# Patient Record
Sex: FEMALE | Race: WHITE | Employment: UNEMPLOYED | URBAN - METROPOLITAN AREA
[De-identification: names, ages, dates, MRNs, and addresses within clinical notes are randomized per-mention and may not be internally consistent; named-entity substitution may affect disease eponyms.]

---

## 2017-03-31 PROBLEM — O03.4 INCOMPLETE ABORTION: Status: ACTIVE | Noted: 2017-03-31

## 2018-11-06 ENCOUNTER — HOSPITAL ENCOUNTER (OUTPATIENT)
Age: 38
Discharge: HOME | End: 2018-11-06
Payer: COMMERCIAL

## 2018-11-06 LAB
GLUCOSE TOLERANCE SCREEN 50G: 147 MG/DL (ref 70–140)
HCT VFR BLD CALC: 34.2 % (ref 34–48)
HEMOGLOBIN: 11.5 G/DL (ref 11.5–15.5)
MCH RBC QN AUTO: 33.3 PG (ref 26–35)
MCHC RBC AUTO-ENTMCNC: 33.6 % (ref 32–34.5)
MCV RBC AUTO: 99.1 FL (ref 80–99.9)
PDW BLD-RTO: 14 FL (ref 11.5–15)
PLATELET # BLD: 180 E9/L (ref 130–450)
PMV BLD AUTO: 8.9 FL (ref 7–12)
RBC # BLD: 3.45 E12/L (ref 3.5–5.5)
WBC # BLD: 7.5 E9/L (ref 4.5–11.5)

## 2018-11-06 PROCEDURE — 82950 GLUCOSE TEST: CPT

## 2018-11-06 PROCEDURE — 36415 COLL VENOUS BLD VENIPUNCTURE: CPT

## 2018-11-06 PROCEDURE — 85027 COMPLETE CBC AUTOMATED: CPT

## 2018-11-26 ENCOUNTER — HOSPITAL ENCOUNTER (OUTPATIENT)
Age: 38
Discharge: HOME | End: 2018-11-26
Payer: COMMERCIAL

## 2018-11-26 LAB
HCT VFR BLD CALC: 35.4 % (ref 34–48)
HEMOGLOBIN: 12 G/DL (ref 11.5–15.5)
MCH RBC QN AUTO: 33.5 PG (ref 26–35)
MCHC RBC AUTO-ENTMCNC: 33.9 % (ref 32–34.5)
MCV RBC AUTO: 98.9 FL (ref 80–99.9)
PDW BLD-RTO: 13.5 FL (ref 11.5–15)
PLATELET # BLD: 171 E9/L (ref 130–450)
PMV BLD AUTO: 9 FL (ref 7–12)
RBC # BLD: 3.58 E12/L (ref 3.5–5.5)
WBC # BLD: 8 E9/L (ref 4.5–11.5)

## 2018-11-26 PROCEDURE — 82951 GLUCOSE TOLERANCE TEST (GTT): CPT

## 2018-11-26 PROCEDURE — 85027 COMPLETE CBC AUTOMATED: CPT

## 2018-11-26 PROCEDURE — 36415 COLL VENOUS BLD VENIPUNCTURE: CPT

## 2018-11-26 PROCEDURE — 82952 GTT-ADDED SAMPLES: CPT

## 2018-11-27 ENCOUNTER — HOSPITAL ENCOUNTER (OUTPATIENT)
Age: 38
Discharge: HOME | End: 2018-11-27
Payer: COMMERCIAL

## 2018-11-27 LAB
GLUCOSE FASTING: 74 MG/ML
GLUCOSE FASTING: 81 MG/ML
GLUCOSE TOLERANCE TEST 1 HOUR: 143 MG/DL
GLUCOSE TOLERANCE TEST 1 HOUR: 162 MG/DL
GLUCOSE TOLERANCE TEST 2 HOUR: 144 MG/DL
GLUCOSE TOLERANCE TEST 2 HOUR: ABNORMAL MG/DL
GLUCOSE TOLERANCE TEST 3 HOUR: 121 MG/DL
GLUCOSE TOLERANCE TEST 3 HOUR: ABNORMAL MG/DL
HCT VFR BLD CALC: 35.2 % (ref 34–48)
HEMOGLOBIN: 11.6 G/DL (ref 11.5–15.5)
MCH RBC QN AUTO: 32.9 PG (ref 26–35)
MCHC RBC AUTO-ENTMCNC: 33 % (ref 32–34.5)
MCV RBC AUTO: 99.7 FL (ref 80–99.9)
PDW BLD-RTO: 13.6 FL (ref 11.5–15)
PLATELET # BLD: 157 E9/L (ref 130–450)
PMV BLD AUTO: 8.6 FL (ref 7–12)
RBC # BLD: 3.53 E12/L (ref 3.5–5.5)
WBC # BLD: 8.1 E9/L (ref 4.5–11.5)

## 2018-11-27 PROCEDURE — 82952 GTT-ADDED SAMPLES: CPT

## 2018-11-27 PROCEDURE — 85027 COMPLETE CBC AUTOMATED: CPT

## 2018-11-27 PROCEDURE — 36415 COLL VENOUS BLD VENIPUNCTURE: CPT

## 2018-11-27 PROCEDURE — 82951 GLUCOSE TOLERANCE TEST (GTT): CPT

## 2018-12-19 ENCOUNTER — HOSPITAL ENCOUNTER (OUTPATIENT)
Age: 38
Discharge: HOME | End: 2018-12-19
Attending: OBSTETRICS & GYNECOLOGY | Admitting: OBSTETRICS & GYNECOLOGY
Payer: COMMERCIAL

## 2018-12-19 ENCOUNTER — APPOINTMENT (OUTPATIENT)
Dept: LABOR AND DELIVERY | Age: 38
End: 2018-12-19
Payer: COMMERCIAL

## 2018-12-19 VITALS — DIASTOLIC BLOOD PRESSURE: 69 MMHG | HEART RATE: 83 BPM | SYSTOLIC BLOOD PRESSURE: 103 MMHG | TEMPERATURE: 97.6 F

## 2018-12-19 PROBLEM — O09.523 MATERNAL AGE > 35, MULTIGRAVIDA, THIRD TRIMESTER: Status: ACTIVE | Noted: 2018-12-19

## 2018-12-19 PROCEDURE — 59025 FETAL NON-STRESS TEST: CPT

## 2018-12-22 ENCOUNTER — HOSPITAL ENCOUNTER (OUTPATIENT)
Age: 38
Discharge: HOME | End: 2018-12-22
Attending: OBSTETRICS & GYNECOLOGY | Admitting: OBSTETRICS & GYNECOLOGY
Payer: COMMERCIAL

## 2018-12-22 ENCOUNTER — APPOINTMENT (OUTPATIENT)
Dept: LABOR AND DELIVERY | Age: 38
End: 2018-12-22
Payer: COMMERCIAL

## 2018-12-22 VITALS
RESPIRATION RATE: 16 BRPM | DIASTOLIC BLOOD PRESSURE: 61 MMHG | TEMPERATURE: 98 F | SYSTOLIC BLOOD PRESSURE: 99 MMHG | HEART RATE: 93 BPM

## 2018-12-22 PROCEDURE — 59025 FETAL NON-STRESS TEST: CPT

## 2018-12-26 ENCOUNTER — APPOINTMENT (OUTPATIENT)
Dept: LABOR AND DELIVERY | Age: 38
End: 2018-12-26
Payer: COMMERCIAL

## 2018-12-26 ENCOUNTER — HOSPITAL ENCOUNTER (OUTPATIENT)
Age: 38
Discharge: HOME | End: 2018-12-26
Attending: OBSTETRICS & GYNECOLOGY | Admitting: OBSTETRICS & GYNECOLOGY
Payer: COMMERCIAL

## 2018-12-26 VITALS
TEMPERATURE: 98.1 F | SYSTOLIC BLOOD PRESSURE: 106 MMHG | HEART RATE: 97 BPM | DIASTOLIC BLOOD PRESSURE: 77 MMHG | RESPIRATION RATE: 15 BRPM

## 2018-12-26 PROCEDURE — 59025 FETAL NON-STRESS TEST: CPT

## 2018-12-29 ENCOUNTER — HOSPITAL ENCOUNTER (OUTPATIENT)
Age: 38
Discharge: HOME | End: 2018-12-29
Attending: OBSTETRICS & GYNECOLOGY | Admitting: OBSTETRICS & GYNECOLOGY
Payer: COMMERCIAL

## 2018-12-29 ENCOUNTER — APPOINTMENT (OUTPATIENT)
Dept: LABOR AND DELIVERY | Age: 38
End: 2018-12-29
Payer: COMMERCIAL

## 2018-12-29 VITALS
DIASTOLIC BLOOD PRESSURE: 66 MMHG | HEART RATE: 95 BPM | RESPIRATION RATE: 16 BRPM | SYSTOLIC BLOOD PRESSURE: 98 MMHG | TEMPERATURE: 97.6 F

## 2018-12-29 PROCEDURE — 59025 FETAL NON-STRESS TEST: CPT

## 2019-01-01 ENCOUNTER — HOSPITAL ENCOUNTER (OUTPATIENT)
Age: 39
Discharge: HOME | End: 2019-01-01
Attending: OBSTETRICS & GYNECOLOGY | Admitting: OBSTETRICS & GYNECOLOGY
Payer: COMMERCIAL

## 2019-01-01 ENCOUNTER — APPOINTMENT (OUTPATIENT)
Dept: LABOR AND DELIVERY | Age: 39
End: 2019-01-01
Payer: COMMERCIAL

## 2019-01-01 VITALS
SYSTOLIC BLOOD PRESSURE: 97 MMHG | HEART RATE: 93 BPM | RESPIRATION RATE: 16 BRPM | DIASTOLIC BLOOD PRESSURE: 63 MMHG | TEMPERATURE: 98.2 F

## 2019-01-01 PROCEDURE — 59025 FETAL NON-STRESS TEST: CPT

## 2019-01-02 ENCOUNTER — HOSPITAL ENCOUNTER (OUTPATIENT)
Age: 39
Setting detail: OBSERVATION
Discharge: HOME | End: 2019-01-02
Attending: OBSTETRICS & GYNECOLOGY | Admitting: OBSTETRICS & GYNECOLOGY
Payer: COMMERCIAL

## 2019-01-02 ENCOUNTER — HOSPITAL ENCOUNTER (OUTPATIENT)
Age: 39
Discharge: HOME | End: 2019-01-04
Payer: COMMERCIAL

## 2019-01-02 VITALS
SYSTOLIC BLOOD PRESSURE: 104 MMHG | DIASTOLIC BLOOD PRESSURE: 74 MMHG | HEART RATE: 88 BPM | TEMPERATURE: 97.3 F | RESPIRATION RATE: 16 BRPM

## 2019-01-02 PROBLEM — O26.93 PREGNANCY, COMPLICATED, THIRD TRIMESTER: Status: ACTIVE | Noted: 2019-01-02

## 2019-01-02 LAB
BASOPHILS ABSOLUTE: 0.03 E9/L (ref 0–0.2)
BASOPHILS RELATIVE PERCENT: 0.4 % (ref 0–2)
EOSINOPHILS ABSOLUTE: 0.1 E9/L (ref 0.05–0.5)
EOSINOPHILS RELATIVE PERCENT: 1.5 % (ref 0–6)
HCT VFR BLD CALC: 36.1 % (ref 34–48)
HEMOGLOBIN: 12 G/DL (ref 11.5–15.5)
IMMATURE GRANULOCYTES #: 0.03 E9/L
IMMATURE GRANULOCYTES %: 0.4 % (ref 0–5)
KLEIHAUER BETKE: NORMAL
LYMPHOCYTES ABSOLUTE: 1.52 E9/L (ref 1.5–4)
LYMPHOCYTES RELATIVE PERCENT: 22.4 % (ref 20–42)
MCH RBC QN AUTO: 33.3 PG (ref 26–35)
MCHC RBC AUTO-ENTMCNC: 33.2 % (ref 32–34.5)
MCV RBC AUTO: 100.3 FL (ref 80–99.9)
MONOCYTES ABSOLUTE: 0.46 E9/L (ref 0.1–0.95)
MONOCYTES RELATIVE PERCENT: 6.8 % (ref 2–12)
NEUTROPHILS ABSOLUTE: 4.65 E9/L (ref 1.8–7.3)
NEUTROPHILS RELATIVE PERCENT: 68.5 % (ref 43–80)
PDW BLD-RTO: 13.5 FL (ref 11.5–15)
PLATELET # BLD: 147 E9/L (ref 130–450)
PMV BLD AUTO: 8.9 FL (ref 7–12)
RBC # BLD: 3.6 E12/L (ref 3.5–5.5)
WBC # BLD: 6.8 E9/L (ref 4.5–11.5)

## 2019-01-02 PROCEDURE — 36415 COLL VENOUS BLD VENIPUNCTURE: CPT

## 2019-01-02 PROCEDURE — 85025 COMPLETE CBC W/AUTO DIFF WBC: CPT

## 2019-01-02 PROCEDURE — G0378 HOSPITAL OBSERVATION PER HR: HCPCS

## 2019-01-02 PROCEDURE — 85460 HEMOGLOBIN FETAL: CPT

## 2019-01-02 PROCEDURE — 87081 CULTURE SCREEN ONLY: CPT

## 2019-01-02 PROCEDURE — 99212 OFFICE O/P EST SF 10 MIN: CPT

## 2019-01-04 ENCOUNTER — HOSPITAL ENCOUNTER (OUTPATIENT)
Age: 39
Discharge: HOME | End: 2019-01-04
Attending: OBSTETRICS & GYNECOLOGY | Admitting: OBSTETRICS & GYNECOLOGY
Payer: COMMERCIAL

## 2019-01-04 ENCOUNTER — APPOINTMENT (OUTPATIENT)
Dept: LABOR AND DELIVERY | Age: 39
End: 2019-01-04
Payer: COMMERCIAL

## 2019-01-04 VITALS — RESPIRATION RATE: 16 BRPM | HEART RATE: 90 BPM | DIASTOLIC BLOOD PRESSURE: 69 MMHG | SYSTOLIC BLOOD PRESSURE: 98 MMHG

## 2019-01-04 PROCEDURE — 59025 FETAL NON-STRESS TEST: CPT

## 2019-01-05 LAB — GROUP B STREP CULTURE: NORMAL

## 2019-01-08 ENCOUNTER — APPOINTMENT (OUTPATIENT)
Dept: LABOR AND DELIVERY | Age: 39
End: 2019-01-08
Payer: COMMERCIAL

## 2019-01-08 ENCOUNTER — HOSPITAL ENCOUNTER (OUTPATIENT)
Age: 39
Discharge: HOME | End: 2019-01-08
Attending: OBSTETRICS & GYNECOLOGY | Admitting: OBSTETRICS & GYNECOLOGY
Payer: COMMERCIAL

## 2019-01-08 VITALS
RESPIRATION RATE: 16 BRPM | HEART RATE: 100 BPM | DIASTOLIC BLOOD PRESSURE: 64 MMHG | SYSTOLIC BLOOD PRESSURE: 100 MMHG | TEMPERATURE: 97.8 F

## 2019-01-08 PROCEDURE — 59025 FETAL NON-STRESS TEST: CPT

## 2019-01-11 ENCOUNTER — HOSPITAL ENCOUNTER (OUTPATIENT)
Age: 39
Discharge: HOME | End: 2019-01-11
Attending: OBSTETRICS & GYNECOLOGY | Admitting: OBSTETRICS & GYNECOLOGY
Payer: COMMERCIAL

## 2019-01-11 ENCOUNTER — APPOINTMENT (OUTPATIENT)
Dept: LABOR AND DELIVERY | Age: 39
End: 2019-01-11
Payer: COMMERCIAL

## 2019-01-11 VITALS — RESPIRATION RATE: 16 BRPM | HEART RATE: 97 BPM | DIASTOLIC BLOOD PRESSURE: 73 MMHG | SYSTOLIC BLOOD PRESSURE: 105 MMHG

## 2019-01-11 PROCEDURE — 59025 FETAL NON-STRESS TEST: CPT

## 2019-01-15 ENCOUNTER — APPOINTMENT (OUTPATIENT)
Dept: LABOR AND DELIVERY | Age: 39
End: 2019-01-15
Payer: COMMERCIAL

## 2019-01-15 ENCOUNTER — HOSPITAL ENCOUNTER (OUTPATIENT)
Age: 39
Discharge: HOME | End: 2019-01-15
Attending: OBSTETRICS & GYNECOLOGY | Admitting: OBSTETRICS & GYNECOLOGY
Payer: COMMERCIAL

## 2019-01-15 VITALS
HEART RATE: 111 BPM | TEMPERATURE: 97.7 F | DIASTOLIC BLOOD PRESSURE: 74 MMHG | RESPIRATION RATE: 16 BRPM | SYSTOLIC BLOOD PRESSURE: 114 MMHG

## 2019-01-15 PROCEDURE — 59025 FETAL NON-STRESS TEST: CPT

## 2019-01-18 ENCOUNTER — APPOINTMENT (OUTPATIENT)
Dept: LABOR AND DELIVERY | Age: 39
End: 2019-01-18
Payer: COMMERCIAL

## 2019-01-18 ENCOUNTER — HOSPITAL ENCOUNTER (OUTPATIENT)
Age: 39
Discharge: HOME | End: 2019-01-18
Attending: OBSTETRICS & GYNECOLOGY | Admitting: OBSTETRICS & GYNECOLOGY
Payer: COMMERCIAL

## 2019-01-18 VITALS
TEMPERATURE: 97.8 F | HEART RATE: 88 BPM | RESPIRATION RATE: 18 BRPM | DIASTOLIC BLOOD PRESSURE: 66 MMHG | SYSTOLIC BLOOD PRESSURE: 102 MMHG

## 2019-01-18 PROCEDURE — 59025 FETAL NON-STRESS TEST: CPT

## 2019-01-22 ENCOUNTER — HOSPITAL ENCOUNTER (OUTPATIENT)
Age: 39
Discharge: HOME | End: 2019-01-22
Attending: OBSTETRICS & GYNECOLOGY | Admitting: OBSTETRICS & GYNECOLOGY
Payer: COMMERCIAL

## 2019-01-22 ENCOUNTER — APPOINTMENT (OUTPATIENT)
Dept: LABOR AND DELIVERY | Age: 39
End: 2019-01-22
Payer: COMMERCIAL

## 2019-01-22 VITALS — RESPIRATION RATE: 16 BRPM | HEART RATE: 90 BPM | SYSTOLIC BLOOD PRESSURE: 102 MMHG | DIASTOLIC BLOOD PRESSURE: 64 MMHG

## 2019-01-22 PROCEDURE — 59025 FETAL NON-STRESS TEST: CPT

## 2019-01-25 ENCOUNTER — HOSPITAL ENCOUNTER (OUTPATIENT)
Age: 39
Discharge: HOME | End: 2019-01-25
Attending: OBSTETRICS & GYNECOLOGY | Admitting: OBSTETRICS & GYNECOLOGY
Payer: COMMERCIAL

## 2019-01-25 ENCOUNTER — APPOINTMENT (OUTPATIENT)
Dept: LABOR AND DELIVERY | Age: 39
End: 2019-01-25
Payer: COMMERCIAL

## 2019-01-25 VITALS
TEMPERATURE: 97.9 F | HEART RATE: 81 BPM | SYSTOLIC BLOOD PRESSURE: 93 MMHG | RESPIRATION RATE: 18 BRPM | DIASTOLIC BLOOD PRESSURE: 57 MMHG

## 2019-01-25 PROCEDURE — 59025 FETAL NON-STRESS TEST: CPT

## 2019-01-29 ENCOUNTER — APPOINTMENT (OUTPATIENT)
Dept: LABOR AND DELIVERY | Age: 39
End: 2019-01-29
Payer: COMMERCIAL

## 2019-01-29 ENCOUNTER — HOSPITAL ENCOUNTER (OUTPATIENT)
Age: 39
Discharge: HOME | End: 2019-01-29
Attending: OBSTETRICS & GYNECOLOGY | Admitting: OBSTETRICS & GYNECOLOGY
Payer: COMMERCIAL

## 2019-01-29 VITALS
SYSTOLIC BLOOD PRESSURE: 99 MMHG | HEART RATE: 93 BPM | DIASTOLIC BLOOD PRESSURE: 69 MMHG | RESPIRATION RATE: 16 BRPM | TEMPERATURE: 97.6 F

## 2019-01-29 PROCEDURE — 59025 FETAL NON-STRESS TEST: CPT

## 2019-02-01 ENCOUNTER — APPOINTMENT (OUTPATIENT)
Dept: LABOR AND DELIVERY | Age: 39
End: 2019-02-01
Payer: COMMERCIAL

## 2019-02-01 ENCOUNTER — HOSPITAL ENCOUNTER (OUTPATIENT)
Age: 39
Discharge: HOME | End: 2019-02-01
Attending: OBSTETRICS & GYNECOLOGY | Admitting: OBSTETRICS & GYNECOLOGY
Payer: COMMERCIAL

## 2019-02-01 VITALS
HEART RATE: 100 BPM | DIASTOLIC BLOOD PRESSURE: 70 MMHG | SYSTOLIC BLOOD PRESSURE: 110 MMHG | RESPIRATION RATE: 16 BRPM | TEMPERATURE: 98 F

## 2019-02-01 PROCEDURE — 59025 FETAL NON-STRESS TEST: CPT

## 2019-02-07 ENCOUNTER — APPOINTMENT (OUTPATIENT)
Dept: LABOR AND DELIVERY | Age: 39
End: 2019-02-07
Payer: COMMERCIAL

## 2019-02-07 ENCOUNTER — ANESTHESIA (OUTPATIENT)
Dept: LABOR AND DELIVERY | Age: 39
End: 2019-02-07
Payer: COMMERCIAL

## 2019-02-07 ENCOUNTER — HOSPITAL ENCOUNTER (INPATIENT)
Age: 39
LOS: 2 days | Discharge: HOME | End: 2019-02-09
Attending: OBSTETRICS & GYNECOLOGY | Admitting: OBSTETRICS & GYNECOLOGY
Payer: COMMERCIAL

## 2019-02-07 ENCOUNTER — ANESTHESIA EVENT (OUTPATIENT)
Dept: LABOR AND DELIVERY | Age: 39
End: 2019-02-07
Payer: COMMERCIAL

## 2019-02-07 PROBLEM — Z3A.40 40 WEEKS GESTATION OF PREGNANCY: Status: ACTIVE | Noted: 2019-02-07

## 2019-02-07 LAB
ABO/RH: NORMAL
AMPHETAMINE SCREEN, URINE: NOT DETECTED
ANION GAP SERPL CALCULATED.3IONS-SCNC: 12 MMOL/L (ref 7–16)
ANTIBODY SCREEN: NORMAL
BARBITURATE SCREEN URINE: NOT DETECTED
BENZODIAZEPINE SCREEN, URINE: NOT DETECTED
BUN BLDV-MCNC: 8 MG/DL (ref 6–20)
CALCIUM SERPL-MCNC: 9 MG/DL (ref 8.6–10.2)
CANNABINOID SCREEN URINE: NOT DETECTED
CHLORIDE BLD-SCNC: 105 MMOL/L (ref 98–107)
CO2: 23 MMOL/L (ref 22–29)
COCAINE METABOLITE SCREEN URINE: NOT DETECTED
CREAT SERPL-MCNC: 0.6 MG/DL (ref 0.5–1)
GFR AFRICAN AMERICAN: >60
GFR NON-AFRICAN AMERICAN: >60 ML/MIN/1.73
GLUCOSE BLD-MCNC: 72 MG/DL (ref 74–99)
HCT VFR BLD CALC: 35.7 % (ref 34–48)
HEMOGLOBIN: 11.9 G/DL (ref 11.5–15.5)
MCH RBC QN AUTO: 33.4 PG (ref 26–35)
MCHC RBC AUTO-ENTMCNC: 33.3 % (ref 32–34.5)
MCV RBC AUTO: 100.3 FL (ref 80–99.9)
METHADONE SCREEN, URINE: NOT DETECTED
OPIATE SCREEN URINE: NOT DETECTED
PDW BLD-RTO: 13.6 FL (ref 11.5–15)
PHENCYCLIDINE SCREEN URINE: NOT DETECTED
PLATELET # BLD: 149 E9/L (ref 130–450)
PMV BLD AUTO: 9.5 FL (ref 7–12)
POTASSIUM SERPL-SCNC: 4.4 MMOL/L (ref 3.5–5)
PROPOXYPHENE SCREEN: NOT DETECTED
RBC # BLD: 3.56 E12/L (ref 3.5–5.5)
SODIUM BLD-SCNC: 140 MMOL/L (ref 132–146)
WBC # BLD: 7.8 E9/L (ref 4.5–11.5)

## 2019-02-07 PROCEDURE — 2580000003 HC RX 258: Performed by: OBSTETRICS & GYNECOLOGY

## 2019-02-07 PROCEDURE — 7200000001 HC VAGINAL DELIVERY

## 2019-02-07 PROCEDURE — 80307 DRUG TEST PRSMV CHEM ANLYZR: CPT

## 2019-02-07 PROCEDURE — 2500000003 HC RX 250 WO HCPCS: Performed by: ANESTHESIOLOGY

## 2019-02-07 PROCEDURE — 4A1HXCZ MONITORING OF PRODUCTS OF CONCEPTION, CARDIAC RATE, EXTERNAL APPROACH: ICD-10-PCS | Performed by: OBSTETRICS & GYNECOLOGY

## 2019-02-07 PROCEDURE — 2500000003 HC RX 250 WO HCPCS: Performed by: OBSTETRICS & GYNECOLOGY

## 2019-02-07 PROCEDURE — 86850 RBC ANTIBODY SCREEN: CPT

## 2019-02-07 PROCEDURE — 80048 BASIC METABOLIC PNL TOTAL CA: CPT

## 2019-02-07 PROCEDURE — 86900 BLOOD TYPING SEROLOGIC ABO: CPT

## 2019-02-07 PROCEDURE — 85027 COMPLETE CBC AUTOMATED: CPT

## 2019-02-07 PROCEDURE — 36415 COLL VENOUS BLD VENIPUNCTURE: CPT

## 2019-02-07 PROCEDURE — 10907ZC DRAINAGE OF AMNIOTIC FLUID, THERAPEUTIC FROM PRODUCTS OF CONCEPTION, VIA NATURAL OR ARTIFICIAL OPENING: ICD-10-PCS | Performed by: OBSTETRICS & GYNECOLOGY

## 2019-02-07 PROCEDURE — 51702 INSERT TEMP BLADDER CATH: CPT

## 2019-02-07 PROCEDURE — 6360000002 HC RX W HCPCS: Performed by: OBSTETRICS & GYNECOLOGY

## 2019-02-07 PROCEDURE — 6370000000 HC RX 637 (ALT 250 FOR IP): Performed by: OBSTETRICS & GYNECOLOGY

## 2019-02-07 PROCEDURE — 86901 BLOOD TYPING SEROLOGIC RH(D): CPT

## 2019-02-07 PROCEDURE — 1220000001 HC SEMI PRIVATE L&D R&B

## 2019-02-07 PROCEDURE — 3E033VJ INTRODUCTION OF OTHER HORMONE INTO PERIPHERAL VEIN, PERCUTANEOUS APPROACH: ICD-10-PCS | Performed by: OBSTETRICS & GYNECOLOGY

## 2019-02-07 PROCEDURE — 6370000000 HC RX 637 (ALT 250 FOR IP)

## 2019-02-07 RX ORDER — OXYCODONE HYDROCHLORIDE AND ACETAMINOPHEN 5; 325 MG/1; MG/1
1 TABLET ORAL EVERY 4 HOURS PRN
Status: DISCONTINUED | OUTPATIENT
Start: 2019-02-07 | End: 2019-02-09 | Stop reason: HOSPADM

## 2019-02-07 RX ORDER — ONDANSETRON 2 MG/ML
4 INJECTION INTRAMUSCULAR; INTRAVENOUS EVERY 6 HOURS PRN
Status: DISCONTINUED | OUTPATIENT
Start: 2019-02-07 | End: 2019-02-07

## 2019-02-07 RX ORDER — LANOLIN 100 %
OINTMENT (GRAM) TOPICAL PRN
Status: DISCONTINUED | OUTPATIENT
Start: 2019-02-07 | End: 2019-02-09 | Stop reason: HOSPADM

## 2019-02-07 RX ORDER — NALBUPHINE HCL 10 MG/ML
5 AMPUL (ML) INJECTION
Status: DISCONTINUED | OUTPATIENT
Start: 2019-02-07 | End: 2019-02-07

## 2019-02-07 RX ORDER — FERROUS SULFATE 325(65) MG
325 TABLET ORAL 2 TIMES DAILY WITH MEALS
Status: DISCONTINUED | OUTPATIENT
Start: 2019-02-08 | End: 2019-02-09 | Stop reason: HOSPADM

## 2019-02-07 RX ORDER — SODIUM CHLORIDE, SODIUM LACTATE, POTASSIUM CHLORIDE, CALCIUM CHLORIDE 600; 310; 30; 20 MG/100ML; MG/100ML; MG/100ML; MG/100ML
INJECTION, SOLUTION INTRAVENOUS CONTINUOUS
Status: DISCONTINUED | OUTPATIENT
Start: 2019-02-07 | End: 2019-02-07

## 2019-02-07 RX ORDER — LIDOCAINE HYDROCHLORIDE 10 MG/ML
30 INJECTION, SOLUTION EPIDURAL; INFILTRATION; INTRACAUDAL; PERINEURAL PRN
Status: COMPLETED | OUTPATIENT
Start: 2019-02-07 | End: 2019-02-07

## 2019-02-07 RX ORDER — TERBUTALINE SULFATE 1 MG/ML
0.25 INJECTION, SOLUTION SUBCUTANEOUS ONCE
Status: DISCONTINUED | OUTPATIENT
Start: 2019-02-07 | End: 2019-02-07

## 2019-02-07 RX ORDER — DOCUSATE SODIUM 100 MG/1
100 CAPSULE, LIQUID FILLED ORAL 2 TIMES DAILY
Status: DISCONTINUED | OUTPATIENT
Start: 2019-02-07 | End: 2019-02-09 | Stop reason: HOSPADM

## 2019-02-07 RX ORDER — SODIUM CHLORIDE 0.9 % (FLUSH) 0.9 %
10 SYRINGE (ML) INJECTION PRN
Status: DISCONTINUED | OUTPATIENT
Start: 2019-02-07 | End: 2019-02-07

## 2019-02-07 RX ORDER — OXYCODONE HYDROCHLORIDE AND ACETAMINOPHEN 5; 325 MG/1; MG/1
2 TABLET ORAL EVERY 4 HOURS PRN
Status: DISCONTINUED | OUTPATIENT
Start: 2019-02-07 | End: 2019-02-09 | Stop reason: HOSPADM

## 2019-02-07 RX ORDER — EPHEDRINE SULFATE/0.9% NACL/PF 50 MG/5 ML
5 SYRINGE (ML) INTRAVENOUS PRN
Status: DISCONTINUED | OUTPATIENT
Start: 2019-02-07 | End: 2019-02-07

## 2019-02-07 RX ORDER — IBUPROFEN 800 MG/1
TABLET ORAL
Status: COMPLETED
Start: 2019-02-07 | End: 2019-02-07

## 2019-02-07 RX ORDER — SODIUM CHLORIDE 0.9 % (FLUSH) 0.9 %
10 SYRINGE (ML) INJECTION PRN
Status: DISCONTINUED | OUTPATIENT
Start: 2019-02-07 | End: 2019-02-09 | Stop reason: HOSPADM

## 2019-02-07 RX ORDER — SODIUM CHLORIDE 0.9 % (FLUSH) 0.9 %
10 SYRINGE (ML) INJECTION EVERY 12 HOURS SCHEDULED
Status: DISCONTINUED | OUTPATIENT
Start: 2019-02-07 | End: 2019-02-09 | Stop reason: HOSPADM

## 2019-02-07 RX ORDER — SODIUM CHLORIDE, SODIUM LACTATE, POTASSIUM CHLORIDE, AND CALCIUM CHLORIDE .6; .31; .03; .02 G/100ML; G/100ML; G/100ML; G/100ML
1000 INJECTION, SOLUTION INTRAVENOUS ONCE
Status: DISCONTINUED | OUTPATIENT
Start: 2019-02-07 | End: 2019-02-07

## 2019-02-07 RX ORDER — IBUPROFEN 800 MG/1
800 TABLET ORAL EVERY 6 HOURS PRN
Status: DISCONTINUED | OUTPATIENT
Start: 2019-02-07 | End: 2019-02-09 | Stop reason: HOSPADM

## 2019-02-07 RX ORDER — SODIUM CHLORIDE 0.9 % (FLUSH) 0.9 %
10 SYRINGE (ML) INJECTION EVERY 12 HOURS SCHEDULED
Status: DISCONTINUED | OUTPATIENT
Start: 2019-02-07 | End: 2019-02-07

## 2019-02-07 RX ORDER — ONDANSETRON HYDROCHLORIDE 8 MG/1
8 TABLET, FILM COATED ORAL EVERY 8 HOURS PRN
Status: DISCONTINUED | OUTPATIENT
Start: 2019-02-07 | End: 2019-02-09 | Stop reason: HOSPADM

## 2019-02-07 RX ORDER — ACETAMINOPHEN 325 MG/1
650 TABLET ORAL EVERY 4 HOURS PRN
Status: DISCONTINUED | OUTPATIENT
Start: 2019-02-07 | End: 2019-02-09 | Stop reason: HOSPADM

## 2019-02-07 RX ADMIN — SODIUM CHLORIDE, POTASSIUM CHLORIDE, SODIUM LACTATE AND CALCIUM CHLORIDE: 600; 310; 30; 20 INJECTION, SOLUTION INTRAVENOUS at 12:41

## 2019-02-07 RX ADMIN — IBUPROFEN 800 MG: 800 TABLET, FILM COATED ORAL at 19:44

## 2019-02-07 RX ADMIN — SODIUM CHLORIDE, POTASSIUM CHLORIDE, SODIUM LACTATE AND CALCIUM CHLORIDE: 600; 310; 30; 20 INJECTION, SOLUTION INTRAVENOUS at 08:40

## 2019-02-07 RX ADMIN — LIDOCAINE HYDROCHLORIDE 30 ML: 10 INJECTION, SOLUTION EPIDURAL; INFILTRATION; INTRACAUDAL; PERINEURAL at 19:19

## 2019-02-07 RX ADMIN — Medication 10 ML/HR: at 10:44

## 2019-02-07 RX ADMIN — Medication 10 ML: at 10:44

## 2019-02-07 RX ADMIN — OXYCODONE AND ACETAMINOPHEN 1 TABLET: 5; 325 TABLET ORAL at 19:46

## 2019-02-07 RX ADMIN — Medication 2 MILLI-UNITS/MIN: at 08:50

## 2019-02-07 RX ADMIN — SODIUM CHLORIDE, POTASSIUM CHLORIDE, SODIUM LACTATE AND CALCIUM CHLORIDE: 600; 310; 30; 20 INJECTION, SOLUTION INTRAVENOUS at 19:00

## 2019-02-07 ASSESSMENT — PAIN SCALES - GENERAL
PAINLEVEL_OUTOF10: 5
PAINLEVEL_OUTOF10: 7
PAINLEVEL_OUTOF10: 7

## 2019-02-08 LAB
HCT VFR BLD CALC: 31.6 % (ref 34–48)
HEMOGLOBIN: 10.6 G/DL (ref 11.5–15.5)

## 2019-02-08 PROCEDURE — 85018 HEMOGLOBIN: CPT

## 2019-02-08 PROCEDURE — 6370000000 HC RX 637 (ALT 250 FOR IP): Performed by: OBSTETRICS & GYNECOLOGY

## 2019-02-08 PROCEDURE — 1220000001 HC SEMI PRIVATE L&D R&B

## 2019-02-08 PROCEDURE — 36415 COLL VENOUS BLD VENIPUNCTURE: CPT

## 2019-02-08 PROCEDURE — 85014 HEMATOCRIT: CPT

## 2019-02-08 RX ADMIN — IBUPROFEN 800 MG: 800 TABLET, FILM COATED ORAL at 11:44

## 2019-02-08 RX ADMIN — IBUPROFEN 800 MG: 800 TABLET, FILM COATED ORAL at 18:54

## 2019-02-08 RX ADMIN — DOCUSATE SODIUM 100 MG: 100 CAPSULE, LIQUID FILLED ORAL at 07:44

## 2019-02-08 ASSESSMENT — PAIN SCALES - GENERAL
PAINLEVEL_OUTOF10: 4
PAINLEVEL_OUTOF10: 3

## 2019-02-08 ASSESSMENT — PAIN DESCRIPTION - DESCRIPTORS: DESCRIPTORS: ACHING;CRAMPING

## 2019-02-09 VITALS
HEIGHT: 56 IN | HEART RATE: 88 BPM | TEMPERATURE: 97.7 F | SYSTOLIC BLOOD PRESSURE: 104 MMHG | BODY MASS INDEX: 25.19 KG/M2 | WEIGHT: 112 LBS | DIASTOLIC BLOOD PRESSURE: 63 MMHG | RESPIRATION RATE: 16 BRPM

## 2019-02-09 PROCEDURE — G0008 ADMIN INFLUENZA VIRUS VAC: HCPCS | Performed by: OBSTETRICS & GYNECOLOGY

## 2019-02-09 PROCEDURE — 6360000002 HC RX W HCPCS: Performed by: OBSTETRICS & GYNECOLOGY

## 2019-02-09 PROCEDURE — 90686 IIV4 VACC NO PRSV 0.5 ML IM: CPT | Performed by: OBSTETRICS & GYNECOLOGY

## 2019-02-09 PROCEDURE — 6370000000 HC RX 637 (ALT 250 FOR IP): Performed by: OBSTETRICS & GYNECOLOGY

## 2019-02-09 PROCEDURE — 90715 TDAP VACCINE 7 YRS/> IM: CPT | Performed by: OBSTETRICS & GYNECOLOGY

## 2019-02-09 PROCEDURE — 90471 IMMUNIZATION ADMIN: CPT | Performed by: OBSTETRICS & GYNECOLOGY

## 2019-02-09 RX ORDER — IBUPROFEN 800 MG/1
800 TABLET ORAL EVERY 6 HOURS PRN
Qty: 120 TABLET | Refills: 1 | Status: SHIPPED | OUTPATIENT
Start: 2019-02-09 | End: 2020-07-09

## 2019-02-09 RX ADMIN — TETANUS TOXOID, REDUCED DIPHTHERIA TOXOID AND ACELLULAR PERTUSSIS VACCINE, ADSORBED 0.5 ML: 5; 2.5; 8; 8; 2.5 SUSPENSION INTRAMUSCULAR at 12:00

## 2019-02-09 RX ADMIN — INFLUENZA A VIRUS A/MICHIGAN/45/2015 X-275 (H1N1) ANTIGEN (FORMALDEHYDE INACTIVATED), INFLUENZA A VIRUS A/SINGAPORE/INFIMH-16-0019/2016 IVR-186 (H3N2) ANTIGEN (FORMALDEHYDE INACTIVATED), INFLUENZA B VIRUS B/PHUKET/3073/2013 ANTIGEN (FORMALDEHYDE INACTIVATED), AND INFLUENZA B VIRUS B/MARYLAND/15/2016 BX-69A ANTIGEN (FORMALDEHYDE INACTIVATED) 0.5 ML: 15; 15; 15; 15 INJECTION, SUSPENSION INTRAMUSCULAR at 12:02

## 2019-02-09 RX ADMIN — IBUPROFEN 800 MG: 800 TABLET, FILM COATED ORAL at 02:21

## 2019-02-09 RX ADMIN — DOCUSATE SODIUM 100 MG: 100 CAPSULE, LIQUID FILLED ORAL at 09:55

## 2019-02-09 ASSESSMENT — PAIN SCALES - GENERAL: PAINLEVEL_OUTOF10: 4

## 2020-07-02 ENCOUNTER — HOSPITAL ENCOUNTER (EMERGENCY)
Age: 40
Discharge: HOME | End: 2020-07-02
Attending: EMERGENCY MEDICINE
Payer: OTHER MISCELLANEOUS

## 2020-07-02 ENCOUNTER — HOSPITAL ENCOUNTER (OUTPATIENT)
Age: 40
Discharge: HOME | End: 2020-07-02
Payer: COMMERCIAL

## 2020-07-02 VITALS
HEART RATE: 78 BPM | SYSTOLIC BLOOD PRESSURE: 108 MMHG | HEIGHT: 57 IN | OXYGEN SATURATION: 99 % | BODY MASS INDEX: 24.16 KG/M2 | WEIGHT: 112 LBS | TEMPERATURE: 98.2 F | RESPIRATION RATE: 20 BRPM | DIASTOLIC BLOOD PRESSURE: 64 MMHG

## 2020-07-02 LAB — GONADOTROPIN, CHORIONIC (HCG) QUANT: ABNORMAL MIU/ML

## 2020-07-02 PROCEDURE — 84702 CHORIONIC GONADOTROPIN TEST: CPT

## 2020-07-02 PROCEDURE — 99283 EMERGENCY DEPT VISIT LOW MDM: CPT

## 2020-07-02 PROCEDURE — 36415 COLL VENOUS BLD VENIPUNCTURE: CPT

## 2020-07-04 ENCOUNTER — HOSPITAL ENCOUNTER (OUTPATIENT)
Age: 40
Discharge: HOME | End: 2020-07-04
Payer: COMMERCIAL

## 2020-07-04 LAB — GONADOTROPIN, CHORIONIC (HCG) QUANT: ABNORMAL MIU/ML

## 2020-07-04 PROCEDURE — 36415 COLL VENOUS BLD VENIPUNCTURE: CPT

## 2020-07-04 PROCEDURE — 84702 CHORIONIC GONADOTROPIN TEST: CPT

## 2020-07-04 ASSESSMENT — ENCOUNTER SYMPTOMS
VOMITING: 0
NAUSEA: 0
SHORTNESS OF BREATH: 0
ABDOMINAL PAIN: 0
EYE REDNESS: 0
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
## 2020-07-07 ENCOUNTER — HOSPITAL ENCOUNTER (OUTPATIENT)
Age: 40
Discharge: HOME | End: 2020-07-09
Payer: COMMERCIAL

## 2020-07-07 PROCEDURE — U0003 INFECTIOUS AGENT DETECTION BY NUCLEIC ACID (DNA OR RNA); SEVERE ACUTE RESPIRATORY SYNDROME CORONAVIRUS 2 (SARS-COV-2) (CORONAVIRUS DISEASE [COVID-19]), AMPLIFIED PROBE TECHNIQUE, MAKING USE OF HIGH THROUGHPUT TECHNOLOGIES AS DESCRIBED BY CMS-2020-01-R: HCPCS

## 2020-07-08 LAB
SARS-COV-2: NOT DETECTED
SOURCE: NORMAL

## 2020-07-09 PROBLEM — O02.1 MISSED ABORTION: Status: ACTIVE | Noted: 2020-07-09

## 2020-07-09 NOTE — PROGRESS NOTES
[address]                                                                                                                     PRE OP INSTRUCTIONS FOR  Derik Fitting        Date: 7/9/2020    Date and time of surgery: 7/10/20   Arrival Time: [facility] will call with time. 1. Do not eat or drink anything after 12 midnight prior to surgery. This includes no water, chewing gum, mints or ice chips. 2. Take the following pills with a small sip of water on the morning of Surgery: None      3. Diabetics may take evening dose of insulin but none after midnight. If you feel symptomatic or low blood sugar take 1-2 ounces of apple juice only. 4. Aspirin, Ibuprofen, Advil, Naproxen, Vitamin E and other Anti-inflammatory products should be stopped  before surgery  as directed by your physician. 5. Check with your Doctor regarding stopping Plavix, Coumadin, Lovenox, Fragmin or other blood thinners. 6. Do not smoke,use illicit drugs and do not drink any alcoholic beverages 24 hours prior to surgery. 7. You may brush your teeth and gargle the morning of surgery. DO NOT SWALLOW WATER    8. You MUST make arrangements for a responsible adult to take you home after your surgery. You will not be allowed to leave alone or drive yourself home. It is strongly suggested someone stay with you the first 24 hrs. Your surgery will be cancelled if you do not have a ride home. 9. A parent/legal guardian must accompany a child scheduled for surgery and plan to stay at the hospital until the child is discharged. Please do not bring other children with you. 10. Please wear simple, loose fitting clothing to the hospital.  Massiel Toledo not bring valuables (money, credit cards, checkbooks, etc.) Do not wear any makeup (including no eye makeup) or nail polish on your fingers or toes. 11. DO NOT wear any jewelry or piercings on day of surgery. All body piercing jewelry must be removed. 12.  Shower the night before surgery with _X__Antibacterial soap ___Hibiclens. 15. Remember to bring Blood Bank bracelet to the hospital on the day of surgery. 14. If you have a Living Will and Durable Power of  for Healthcare, please bring in a copy. 15. If appropriate bring crutches, inspirex, etc... 12. Notify your Surgeon if you develop any illness between now and surgery time, cough, cold, fever, sore throat, nausea, vomiting, etc.  Please notify your surgeon if you experience dizziness, shortness of breath or blurred vision between now & the time of your surgery. 17. If you have ___dentures, they will be removed before going to the OR; we will provide you a container. If you wear ___contact lenses or ___glasses, they will be removed; please bring a case for them. 18. To provide excellent care visitors will be limited to one in the room at any given time. 19. Please bring picture ID and insurance card. 20. Sleep apnea patients need to bring CPAP to hospital on day of surgery. 21. Visit our web site for additional information: [email or web address]    22. During flu season no children under the age of 15 are permitted in the hospital for the safety of all patients. 23. Other Come in through main lobby, go to information desk. Please call pre admission testing if you have any further questions.    [facility]     [address]

## 2020-07-10 ENCOUNTER — HOSPITAL ENCOUNTER (OUTPATIENT)
Age: 40
Setting detail: OUTPATIENT SURGERY
Discharge: HOME | End: 2020-07-10
Attending: OBSTETRICS & GYNECOLOGY | Admitting: OBSTETRICS & GYNECOLOGY
Payer: COMMERCIAL

## 2020-07-10 ENCOUNTER — ANESTHESIA (OUTPATIENT)
Dept: OPERATING ROOM | Age: 40
End: 2020-07-10
Payer: COMMERCIAL

## 2020-07-10 ENCOUNTER — ANESTHESIA EVENT (OUTPATIENT)
Dept: OPERATING ROOM | Age: 40
End: 2020-07-10
Payer: COMMERCIAL

## 2020-07-10 VITALS
OXYGEN SATURATION: 97 % | WEIGHT: 97.8 LBS | HEIGHT: 57 IN | TEMPERATURE: 97.8 F | DIASTOLIC BLOOD PRESSURE: 60 MMHG | SYSTOLIC BLOOD PRESSURE: 110 MMHG | BODY MASS INDEX: 21.1 KG/M2 | RESPIRATION RATE: 20 BRPM | HEART RATE: 95 BPM

## 2020-07-10 VITALS
OXYGEN SATURATION: 100 % | SYSTOLIC BLOOD PRESSURE: 93 MMHG | DIASTOLIC BLOOD PRESSURE: 66 MMHG | RESPIRATION RATE: 21 BRPM

## 2020-07-10 LAB
ABO/RH: NORMAL
ANTIBODY SCREEN: NORMAL

## 2020-07-10 PROCEDURE — 7100000011 HC PHASE II RECOVERY - ADDTL 15 MIN: Performed by: OBSTETRICS & GYNECOLOGY

## 2020-07-10 PROCEDURE — 6360000002 HC RX W HCPCS: Performed by: ANESTHESIOLOGIST ASSISTANT

## 2020-07-10 PROCEDURE — 86850 RBC ANTIBODY SCREEN: CPT

## 2020-07-10 PROCEDURE — 7100000000 HC PACU RECOVERY - FIRST 15 MIN: Performed by: OBSTETRICS & GYNECOLOGY

## 2020-07-10 PROCEDURE — 3600000002 HC SURGERY LEVEL 2 BASE: Performed by: OBSTETRICS & GYNECOLOGY

## 2020-07-10 PROCEDURE — 3700000001 HC ADD 15 MINUTES (ANESTHESIA): Performed by: OBSTETRICS & GYNECOLOGY

## 2020-07-10 PROCEDURE — 2580000003 HC RX 258: Performed by: ANESTHESIOLOGY

## 2020-07-10 PROCEDURE — 3700000000 HC ANESTHESIA ATTENDED CARE: Performed by: OBSTETRICS & GYNECOLOGY

## 2020-07-10 PROCEDURE — 36415 COLL VENOUS BLD VENIPUNCTURE: CPT

## 2020-07-10 PROCEDURE — 2709999900 HC NON-CHARGEABLE SUPPLY: Performed by: OBSTETRICS & GYNECOLOGY

## 2020-07-10 PROCEDURE — 7100000010 HC PHASE II RECOVERY - FIRST 15 MIN: Performed by: OBSTETRICS & GYNECOLOGY

## 2020-07-10 PROCEDURE — 86901 BLOOD TYPING SEROLOGIC RH(D): CPT

## 2020-07-10 PROCEDURE — 3600000012 HC SURGERY LEVEL 2 ADDTL 15MIN: Performed by: OBSTETRICS & GYNECOLOGY

## 2020-07-10 PROCEDURE — 7100000001 HC PACU RECOVERY - ADDTL 15 MIN: Performed by: OBSTETRICS & GYNECOLOGY

## 2020-07-10 PROCEDURE — 88305 TISSUE EXAM BY PATHOLOGIST: CPT

## 2020-07-10 PROCEDURE — 86900 BLOOD TYPING SEROLOGIC ABO: CPT

## 2020-07-10 RX ORDER — LIDOCAINE HYDROCHLORIDE 20 MG/ML
INJECTION, SOLUTION INTRAVENOUS PRN
Status: DISCONTINUED | OUTPATIENT
Start: 2020-07-10 | End: 2020-07-10 | Stop reason: SDUPTHER

## 2020-07-10 RX ORDER — PROMETHAZINE HYDROCHLORIDE 25 MG/ML
6.25 INJECTION, SOLUTION INTRAMUSCULAR; INTRAVENOUS
Status: DISCONTINUED | OUTPATIENT
Start: 2020-07-10 | End: 2020-07-10 | Stop reason: HOSPADM

## 2020-07-10 RX ORDER — HYDROCODONE BITARTRATE AND ACETAMINOPHEN 5; 325 MG/1; MG/1
1 TABLET ORAL PRN
Status: DISCONTINUED | OUTPATIENT
Start: 2020-07-10 | End: 2020-07-10 | Stop reason: HOSPADM

## 2020-07-10 RX ORDER — MORPHINE SULFATE 2 MG/ML
2 INJECTION, SOLUTION INTRAMUSCULAR; INTRAVENOUS EVERY 5 MIN PRN
Status: DISCONTINUED | OUTPATIENT
Start: 2020-07-10 | End: 2020-07-10 | Stop reason: HOSPADM

## 2020-07-10 RX ORDER — SODIUM CHLORIDE, SODIUM LACTATE, POTASSIUM CHLORIDE, CALCIUM CHLORIDE 600; 310; 30; 20 MG/100ML; MG/100ML; MG/100ML; MG/100ML
INJECTION, SOLUTION INTRAVENOUS CONTINUOUS
Status: DISCONTINUED | OUTPATIENT
Start: 2020-07-10 | End: 2020-07-10 | Stop reason: HOSPADM

## 2020-07-10 RX ORDER — DEXAMETHASONE SODIUM PHOSPHATE 4 MG/ML
INJECTION, SOLUTION INTRA-ARTICULAR; INTRALESIONAL; INTRAMUSCULAR; INTRAVENOUS; SOFT TISSUE PRN
Status: DISCONTINUED | OUTPATIENT
Start: 2020-07-10 | End: 2020-07-10 | Stop reason: SDUPTHER

## 2020-07-10 RX ORDER — SODIUM CHLORIDE 0.9 % (FLUSH) 0.9 %
10 SYRINGE (ML) INJECTION EVERY 12 HOURS SCHEDULED
Status: DISCONTINUED | OUTPATIENT
Start: 2020-07-10 | End: 2020-07-10 | Stop reason: HOSPADM

## 2020-07-10 RX ORDER — MEPERIDINE HYDROCHLORIDE 25 MG/ML
12.5 INJECTION INTRAMUSCULAR; INTRAVENOUS; SUBCUTANEOUS EVERY 5 MIN PRN
Status: DISCONTINUED | OUTPATIENT
Start: 2020-07-10 | End: 2020-07-10 | Stop reason: HOSPADM

## 2020-07-10 RX ORDER — FENTANYL CITRATE 50 UG/ML
25 INJECTION, SOLUTION INTRAMUSCULAR; INTRAVENOUS EVERY 5 MIN PRN
Status: DISCONTINUED | OUTPATIENT
Start: 2020-07-10 | End: 2020-07-10 | Stop reason: HOSPADM

## 2020-07-10 RX ORDER — ONDANSETRON 2 MG/ML
INJECTION INTRAMUSCULAR; INTRAVENOUS PRN
Status: DISCONTINUED | OUTPATIENT
Start: 2020-07-10 | End: 2020-07-10 | Stop reason: SDUPTHER

## 2020-07-10 RX ORDER — SODIUM CHLORIDE 0.9 % (FLUSH) 0.9 %
10 SYRINGE (ML) INJECTION PRN
Status: DISCONTINUED | OUTPATIENT
Start: 2020-07-10 | End: 2020-07-10 | Stop reason: HOSPADM

## 2020-07-10 RX ORDER — PROPOFOL 10 MG/ML
INJECTION, EMULSION INTRAVENOUS PRN
Status: DISCONTINUED | OUTPATIENT
Start: 2020-07-10 | End: 2020-07-10 | Stop reason: SDUPTHER

## 2020-07-10 RX ORDER — FENTANYL CITRATE 50 UG/ML
INJECTION, SOLUTION INTRAMUSCULAR; INTRAVENOUS PRN
Status: DISCONTINUED | OUTPATIENT
Start: 2020-07-10 | End: 2020-07-10 | Stop reason: SDUPTHER

## 2020-07-10 RX ORDER — HYDROCODONE BITARTRATE AND ACETAMINOPHEN 5; 325 MG/1; MG/1
2 TABLET ORAL PRN
Status: DISCONTINUED | OUTPATIENT
Start: 2020-07-10 | End: 2020-07-10 | Stop reason: HOSPADM

## 2020-07-10 RX ADMIN — SODIUM CHLORIDE, POTASSIUM CHLORIDE, SODIUM LACTATE AND CALCIUM CHLORIDE: 600; 310; 30; 20 INJECTION, SOLUTION INTRAVENOUS at 06:58

## 2020-07-10 RX ADMIN — FENTANYL CITRATE 100 MCG: 50 INJECTION, SOLUTION INTRAMUSCULAR; INTRAVENOUS at 07:01

## 2020-07-10 RX ADMIN — DEXAMETHASONE SODIUM PHOSPHATE 4 MG: 4 INJECTION, SOLUTION INTRAMUSCULAR; INTRAVENOUS at 07:01

## 2020-07-10 RX ADMIN — PROPOFOL 200 MG: 10 INJECTION, EMULSION INTRAVENOUS at 07:01

## 2020-07-10 RX ADMIN — LIDOCAINE HYDROCHLORIDE 60 MG: 20 INJECTION, SOLUTION INTRAVENOUS at 07:01

## 2020-07-10 RX ADMIN — Medication 500 ML: at 07:21

## 2020-07-10 RX ADMIN — ONDANSETRON 4 MG: 2 INJECTION INTRAMUSCULAR; INTRAVENOUS at 07:01

## 2020-07-10 RX ADMIN — SODIUM CHLORIDE, POTASSIUM CHLORIDE, SODIUM LACTATE AND CALCIUM CHLORIDE: 600; 310; 30; 20 INJECTION, SOLUTION INTRAVENOUS at 05:41

## 2020-07-10 ASSESSMENT — PULMONARY FUNCTION TESTS
PIF_VALUE: 7
PIF_VALUE: 10
PIF_VALUE: 6
PIF_VALUE: 9
PIF_VALUE: 9
PIF_VALUE: 7
PIF_VALUE: 8
PIF_VALUE: 2
PIF_VALUE: 3
PIF_VALUE: 0
PIF_VALUE: 10
PIF_VALUE: 9
PIF_VALUE: 3
PIF_VALUE: 15
PIF_VALUE: 5
PIF_VALUE: 0
PIF_VALUE: 10
PIF_VALUE: 18
PIF_VALUE: 10
PIF_VALUE: 9
PIF_VALUE: 0
PIF_VALUE: 7
PIF_VALUE: 1
PIF_VALUE: 9
PIF_VALUE: 15
PIF_VALUE: 6
PIF_VALUE: 0

## 2020-07-10 ASSESSMENT — PAIN DESCRIPTION - PAIN TYPE
TYPE: SURGICAL PAIN
TYPE: SURGICAL PAIN

## 2020-07-10 ASSESSMENT — LIFESTYLE VARIABLES: SMOKING_STATUS: 0

## 2020-07-10 ASSESSMENT — PAIN SCALES - GENERAL
PAINLEVEL_OUTOF10: 0

## 2020-07-10 ASSESSMENT — PAIN - FUNCTIONAL ASSESSMENT: PAIN_FUNCTIONAL_ASSESSMENT: 0-10

## 2020-07-10 NOTE — PROGRESS NOTES
CLINICAL PHARMACY NOTE: MEDS TO [address] Select Patient?: No  Total # of Prescriptions Filled: 2   The following medications were delivered to the patient:  · Ibuprofen 600 mg  · Doxycycline monohydrate 100 mg  Total # of Interventions Completed: 2  Time Spent (min): 30    Additional Documentation:

## 2020-07-10 NOTE — ANESTHESIA POSTPROCEDURE EVALUATION
Department of Anesthesiology  Postprocedure Note    Patient: Tony Tyson  MRN: [id number]  [place]: 1980  Date of evaluation: 7/10/2020  Time:  11:57 AM     Procedure Summary     Date:  07/10/20 Room / Location:  [address] [place] [address] / [facility]    Anesthesia Start:  1314 Anesthesia Stop:  0616    Procedure:  SUCTION DILATATION AND CURETTAGE (N/A ) Diagnosis:  (MISSED AB)    Surgeon:  Марина Stewart MD Responsible Provider:  Blanca Ambrose MD    Anesthesia Type:  general ASA Status:  1          Anesthesia Type: general    Riya Phase I: Riya Score: 10    Riya Phase II: Riya Score: 10    Last vitals: Reviewed and per EMR flowsheets.        Anesthesia Post Evaluation    Patient location during evaluation: PACU  Patient participation: complete - patient participated  Level of consciousness: awake  Airway patency: patent  Nausea & Vomiting: no nausea and no vomiting  Complications: no  Cardiovascular status: hemodynamically stable  Respiratory status: acceptable  Hydration status: euvolemic

## 2020-07-10 NOTE — PROGRESS NOTES
7/10/20 1000 reviewed discharge instructions with pt.  Pt verbalized understanding, signed in agreement and given copy. melchor gibson

## 2020-07-10 NOTE — PROGRESS NOTES
7/10/20 1025 reviewed discharge instructions with pt and her .  Both verbalized understanding and  signed in agreement. melchor gibson

## 2020-07-10 NOTE — H&P
Subjective:      Yakov Macdonald is an 42 y.o. female at 6 and 2/7 weeks gestation presenting with ultrasound showing no fetal heart tones with about 7 to 8 weeks gestation sac and decreasing beta hCG titers. She is also complaining of contractions every a few minutes lasting few seconds. Other associated symptoms include light bleeding and low back pain. Past Medical History:   Diagnosis Date    Infertility, female    [facility] Maternal age > 28, multigravida, third trimester 2018    Missed  2020    Polycystic ovarian syndrome      (spontaneous vaginal delivery) 2019       Review of Systems  Pertinent items are noted in HPI. Objective: Wt 97 lb (44 kg)   LMP 2020   BMI 21.18 kg/m²   Weight 97 lb (44 kg), last menstrual period 2020, unknown if currently breastfeeding. General:   alert, appears stated age and cooperative   Cervix:   closed.    FHT:   0 BPM     Lab Review    CBC:   Lab Results   Component Value Date    WBC 7.8 2019    RBC 3.56 2019    HGB 10.6 2019    HCT 31.6 2019    .3 2019    MCH 33.4 2019    MCHC 33.3 2019    RDW 13.6 2019     2019    MPV 9.5 2019     CMP:    Lab Results   Component Value Date     2019    K 4.4 2019     2019    CO2 23 2019    BUN 8 2019    CREATININE 0.6 2019    GFRAA >60 2019    LABGLOM >60 2019    GLUCOSE 72 2019    PROT 6.7 01/10/2014    LABALBU 3.9 01/10/2014    CALCIUM 9.0 2019    BILITOT 0.5 01/10/2014    ALKPHOS 61 01/10/2014    AST 16 01/10/2014    ALT 13 01/10/2014     U/A:    Lab Results   Component Value Date    COLORU RED 01/10/2014    PHUR 8.5 01/10/2014    WBCUA 5-10 01/10/2014    RBCUA >20 01/10/2014    BACTERIA MODERATE 01/10/2014    CLARITYU CLOUDY 01/10/2014    SPECGRAV 1.020 01/10/2014    LEUKOCYTESUR MODERATE 01/10/2014    UROBILINOGEN 1.0 01/10/2014    BILIRUBINUR NEGATIVE 01/10/2014    BLOODU LARGE 01/10/2014    GLUCOSEU 100 01/10/2014          Assessment:     Missed     Plan:     Suction D&C    Amine R MD Allan,7/10/2020 5:00 AM

## 2020-07-10 NOTE — ANESTHESIA PRE PROCEDURE
Department of Anesthesiology  Preprocedure Note       Name:  Nasima Garcia   Age:  42 y.o.  :  1980                                          MRN:  [id number]         Date:  7/10/2020      Surgeon: Edda Saucedo):  Carey Calderon MD    Procedure: Procedure(s):  SUCTION DILATATION AND CURETTAGE    Medications prior to admission:   Prior to Admission medications    Medication Sig Start Date End Date Taking?  Authorizing Provider   Prenatal Vit-Fe Fumarate-FA (PRENATAL 1+1 PO) Take 1 tablet by mouth daily   Yes Historical Provider, MD   metFORMIN ER (GLUCOPHAGE-XR) 500 MG XR tablet Take 500 mg by mouth 2 times daily    Yes Historical Provider, MD       Current medications:    Current Facility-Administered Medications   Medication Dose Route Frequency Provider Last Rate Last Dose    lactated ringers infusion   Intravenous Continuous Chris Singh MD 50 mL/hr at 07/10/20 0541      lactated ringers infusion   Intravenous Continuous Gordy Lemus MD        sodium chloride flush 0.9 % injection 10 mL  10 mL Intravenous 2 times per day Gordy Lemus MD        sodium chloride flush 0.9 % injection 10 mL  10 mL Intravenous PRN Gordy Lemus MD           Allergies:  No Known Allergies    Problem List:    Patient Active Problem List   Diagnosis Code    Antepartum hemorrhage, antepartum O46.90    Antepartum genitourinary tract infection O23.90    Active labor at term [id number]    Incomplete  O12.1    Maternal age > 28, multigravida, third trimester O09.523    Pregnancy, complicated, third trimester O26.93    40 weeks gestation of pregnancy Z3A.40     (spontaneous vaginal delivery) O80    Missed  O02.1       Past Medical History:        Diagnosis Date    Infertility, female    [facility] Maternal age > 28, multigravida, third trimester 2018    Missed  2020    Polycystic ovarian syndrome      (spontaneous vaginal delivery) 2019       Past Surgical History: Procedure Laterality Date    ADENOIDECTOMY AND TYMPANOSTOMY TUBE PLACEMENT      DILATION AND CURETTAGE OF UTERUS  03/31/2017    suction dilation and curettage    MANDIBLE FRACTURE SURGERY      TONSILLECTOMY AND ADENOIDECTOMY         Social History:    Social History     Tobacco Use    Smoking status: Never Smoker    Smokeless tobacco: Never Used   Substance Use Topics    Alcohol use: No                                Counseling given: Not Answered      Vital Signs (Current):   Vitals:    07/09/20 1306 07/10/20 0525   BP:  112/74   Pulse:  76   Resp:  16   Temp:  98.5 °F (36.9 °C)   TempSrc:  Temporal   SpO2:  98%   Weight: 97 lb (44 kg) 97 lb 12.8 oz (44.4 kg)   Height:  4' 8.75\" (1.441 m)                                              BP Readings from Last 3 Encounters:   07/10/20 112/74   07/02/20 108/64   02/09/19 104/63       NPO Status: Time of last liquid consumption: 2359                        Time of last solid consumption: 2359                        Date of last liquid consumption: 07/09/20                        Date of last solid food consumption: 07/09/20    BMI:   Wt Readings from Last 3 Encounters:   07/10/20 97 lb 12.8 oz (44.4 kg)   07/02/20 112 lb (50.8 kg)   02/08/19 112 lb (50.8 kg)     Body mass index is 21.35 kg/m².     CBC:   Lab Results   Component Value Date    WBC 7.8 02/07/2019    RBC 3.56 02/07/2019    HGB 10.6 02/08/2019    HCT 31.6 02/08/2019    .3 02/07/2019    RDW 13.6 02/07/2019     02/07/2019       CMP:   Lab Results   Component Value Date     02/07/2019    K 4.4 02/07/2019     02/07/2019    CO2 23 02/07/2019    BUN 8 02/07/2019    CREATININE 0.6 02/07/2019    GFRAA >60 02/07/2019    LABGLOM >60 02/07/2019    GLUCOSE 72 02/07/2019    PROT 6.7 01/10/2014    CALCIUM 9.0 02/07/2019    BILITOT 0.5 01/10/2014    ALKPHOS 61 01/10/2014    AST 16 01/10/2014    ALT 13 01/10/2014       POC Tests: No results for input(s): POCGLU, POCNA, POCK, POCCL, POCBUN, Elim Clamp in the last 72 hours. Coags: No results found for: PROTIME, INR, APTT    HCG (If Applicable): No results found for: PREGTESTUR, PREGSERUM, HCG, HCGQUANT     ABGs: No results found for: PHART, PO2ART, VRW4OYA, QYL8MDB, BEART, [id number]     Type & Screen (If Applicable):  No results found for: LABABO, LABRH    Drug/Infectious Status (If Applicable):  No results found for: HIV, HEPCAB    COVID-19 Screening (If Applicable):   Lab Results   Component Value Date    COVID19 Not Detected 07/07/2020         Anesthesia Evaluation  Patient summary reviewed no history of anesthetic complications:   Airway: Mallampati: II  TM distance: >3 FB   Neck ROM: full  Mouth opening: > = 3 FB Dental: normal exam         Pulmonary:Negative Pulmonary ROS breath sounds clear to auscultation      (-) not a current smoker                           Cardiovascular:Negative CV ROS            Rhythm: regular  Rate: normal                    Neuro/Psych:   Negative Neuro/Psych ROS              GI/Hepatic/Renal: Neg GI/Hepatic/Renal ROS            Endo/Other:                      ROS comment: Polycystic ovarian syndrome    Missed AB Abdominal:         (-) obese     Vascular: negative vascular ROS. Anesthesia Plan      general     ASA 1       Induction: intravenous. MIPS: Postoperative opioids intended and Prophylactic antiemetics administered. Anesthetic plan and risks discussed with patient. Plan discussed with CRNA. DOS STAFF ADDENDUM:    Pt seen and examined, chart reviewed (including anesthesia, drug and allergy history). Anesthetic plan, risks, benefits, alternatives, and personnel involved discussed with patient. Patient verbalized an understanding and agrees to proceed. Plan discussed with care team members and agreed upon.     Luz eYe MD  Staff Anesthesiologist  6:40 AM        Luz Yee MD   7/10/2020

## 2020-07-25 NOTE — OP NOTE
DATE OF PROCEDURE:     2020    PATIENT NAME:    Nasima Garcia  SURGEON:     Gordy Lemus MD   ASSISTANT:   PREOPERATIVE DIAGNOSIS:   Missed . POSTOPERATIVE DIAGNOSIS:   Same. OPERATION:      Suction evacuation of retained products of conception. ANESTHESIA:     General.   ESTIMATED BLOOD LOSS:    About 50 mL. COMPLICATIONS:     None. PROCEDURE: The patient was brought to the operating room where general   anesthesia was induced. She was then placed in lithotomy position. The   abdomen, perineum, and adjacent areas of the thighs were scrubbed. The   patient was then draped according to routine sterile precautions. The   bladder was catheterized. Examination under anesthesia revealed a boggy uterus about 9 weeks   gestation in size. The cervix was open and slightly dilated. There were   no adnexal masses. A weighted speculum was inserted in the vagina. The   cervix was identified and grasped with the Ring forceps. Selecting a size 9   suction curette, suction evacuation of the uterine cavity was performed. Finally, sharp curretage of the endometrium was performed and all tissues submitted to pathology. All instruments were then removed from the vagina. The uterus was massaged   and was found to be firm and well contracted. After an initial blood loss of   about 50 mL, complete hemostasis was observed. She was then taken out of   lithotomy position. Anesthesia was reversed, and she was transferred to the   recovery room in a stable condition. There were no operative complications.      Gordy Lemus  2020  7:42 AM

## 2020-09-30 ENCOUNTER — HOSPITAL ENCOUNTER (OUTPATIENT)
Age: 40
Discharge: HOME | End: 2020-09-30
Payer: COMMERCIAL

## 2020-09-30 LAB — GONADOTROPIN, CHORIONIC (HCG) QUANT: ABNORMAL MIU/ML

## 2020-09-30 PROCEDURE — 36415 COLL VENOUS BLD VENIPUNCTURE: CPT

## 2020-09-30 PROCEDURE — 84702 CHORIONIC GONADOTROPIN TEST: CPT

## 2020-10-02 ENCOUNTER — HOSPITAL ENCOUNTER (OUTPATIENT)
Age: 40
Discharge: HOME | End: 2020-10-02
Payer: COMMERCIAL

## 2020-10-02 LAB — GONADOTROPIN, CHORIONIC (HCG) QUANT: ABNORMAL MIU/ML

## 2020-10-02 PROCEDURE — 36415 COLL VENOUS BLD VENIPUNCTURE: CPT

## 2020-10-02 PROCEDURE — 84702 CHORIONIC GONADOTROPIN TEST: CPT

## 2021-02-06 ENCOUNTER — HOSPITAL ENCOUNTER (OUTPATIENT)
Age: 41
Discharge: HOME | End: 2021-02-06
Payer: COMMERCIAL

## 2021-02-06 LAB
BASOPHILS ABSOLUTE: 0.04 E9/L (ref 0–0.2)
BASOPHILS RELATIVE PERCENT: 0.5 % (ref 0–2)
EOSINOPHILS ABSOLUTE: 0.15 E9/L (ref 0.05–0.5)
EOSINOPHILS RELATIVE PERCENT: 1.9 % (ref 0–6)
GLUCOSE TOLERANCE SCREEN 50G: 159 MG/DL (ref 70–140)
HCT VFR BLD CALC: 37.1 % (ref 34–48)
HEMOGLOBIN: 12.1 G/DL (ref 11.5–15.5)
IMMATURE GRANULOCYTES #: 0.02 E9/L
IMMATURE GRANULOCYTES %: 0.3 % (ref 0–5)
LYMPHOCYTES ABSOLUTE: 1.51 E9/L (ref 1.5–4)
LYMPHOCYTES RELATIVE PERCENT: 19 % (ref 20–42)
MCH RBC QN AUTO: 32.4 PG (ref 26–35)
MCHC RBC AUTO-ENTMCNC: 32.6 % (ref 32–34.5)
MCV RBC AUTO: 99.2 FL (ref 80–99.9)
MONOCYTES ABSOLUTE: 0.46 E9/L (ref 0.1–0.95)
MONOCYTES RELATIVE PERCENT: 5.8 % (ref 2–12)
NEUTROPHILS ABSOLUTE: 5.78 E9/L (ref 1.8–7.3)
NEUTROPHILS RELATIVE PERCENT: 72.5 % (ref 43–80)
PDW BLD-RTO: 13.5 FL (ref 11.5–15)
PLATELET # BLD: 181 E9/L (ref 130–450)
PMV BLD AUTO: 8.9 FL (ref 7–12)
RBC # BLD: 3.74 E12/L (ref 3.5–5.5)
WBC # BLD: 8 E9/L (ref 4.5–11.5)

## 2021-02-06 PROCEDURE — 36415 COLL VENOUS BLD VENIPUNCTURE: CPT

## 2021-02-06 PROCEDURE — 82950 GLUCOSE TEST: CPT

## 2021-02-06 PROCEDURE — 85025 COMPLETE CBC W/AUTO DIFF WBC: CPT

## 2021-03-06 ENCOUNTER — HOSPITAL ENCOUNTER (OUTPATIENT)
Age: 41
Discharge: HOME | End: 2021-03-06
Payer: COMMERCIAL

## 2021-03-06 LAB
GLUCOSE FASTING: 66 MG/ML
GLUCOSE TOLERANCE TEST 1 HOUR: 201 MG/DL
GLUCOSE TOLERANCE TEST 2 HOUR: 200 MG/DL
GLUCOSE TOLERANCE TEST 3 HOUR: 155 MG/DL

## 2021-03-06 PROCEDURE — 82951 GLUCOSE TOLERANCE TEST (GTT): CPT

## 2021-03-06 PROCEDURE — 82952 GTT-ADDED SAMPLES: CPT

## 2021-03-06 PROCEDURE — 36415 COLL VENOUS BLD VENIPUNCTURE: CPT

## 2021-03-16 ENCOUNTER — HOSPITAL ENCOUNTER (OUTPATIENT)
Age: 41
Discharge: HOME | End: 2021-03-16
Attending: OBSTETRICS & GYNECOLOGY | Admitting: OBSTETRICS & GYNECOLOGY
Payer: COMMERCIAL

## 2021-03-16 ENCOUNTER — APPOINTMENT (OUTPATIENT)
Dept: LABOR AND DELIVERY | Age: 41
End: 2021-03-16
Payer: COMMERCIAL

## 2021-03-16 VITALS
TEMPERATURE: 98.4 F | DIASTOLIC BLOOD PRESSURE: 70 MMHG | HEART RATE: 108 BPM | SYSTOLIC BLOOD PRESSURE: 105 MMHG | RESPIRATION RATE: 16 BRPM

## 2021-03-16 PROCEDURE — 59025 FETAL NON-STRESS TEST: CPT

## 2021-03-16 NOTE — PROGRESS NOTES
G 5 P 2   31 5/7 week   Pt of Dr. Ivory Bamberger  Arrived ambulatory to unit for scheduled NST due to [place], and GDM  Pt denies any bleeding or leaking of fluid, stated she has occasional justin buchanan contractions. Pt stated that she perceives fetal movement and has not had any other complications with pregnancy. Test discussed with pt who verbalized understanding.

## 2021-03-19 ENCOUNTER — HOSPITAL ENCOUNTER (OUTPATIENT)
Age: 41
Discharge: HOME | End: 2021-03-19
Attending: OBSTETRICS & GYNECOLOGY | Admitting: OBSTETRICS & GYNECOLOGY
Payer: COMMERCIAL

## 2021-03-19 ENCOUNTER — APPOINTMENT (OUTPATIENT)
Dept: LABOR AND DELIVERY | Age: 41
End: 2021-03-19
Payer: COMMERCIAL

## 2021-03-19 VITALS
TEMPERATURE: 98.1 F | HEART RATE: 101 BPM | RESPIRATION RATE: 16 BRPM | DIASTOLIC BLOOD PRESSURE: 67 MMHG | SYSTOLIC BLOOD PRESSURE: 100 MMHG

## 2021-03-19 PROCEDURE — 59025 FETAL NON-STRESS TEST: CPT

## 2021-03-19 NOTE — PROGRESS NOTES
Patient received for nst at 32 weeks 1 day for gestational diabetes, advanced maternal age. POC reviewed. Patient without questions. States baby is active, denies leaking of fluid, contractions or any concerns with pregnancy.

## 2021-03-19 NOTE — PROGRESS NOTES
Dr Piper Burton here, strip reviewed. Patient discharged ambulatory to home with follow up as scheduled. Patient instructed on kick counts. No questions regarding info received.

## 2021-03-23 ENCOUNTER — HOSPITAL ENCOUNTER (OUTPATIENT)
Age: 41
Discharge: HOME | End: 2021-03-23
Attending: OBSTETRICS & GYNECOLOGY | Admitting: OBSTETRICS & GYNECOLOGY
Payer: COMMERCIAL

## 2021-03-23 ENCOUNTER — APPOINTMENT (OUTPATIENT)
Dept: LABOR AND DELIVERY | Age: 41
End: 2021-03-23
Payer: COMMERCIAL

## 2021-03-23 VITALS
DIASTOLIC BLOOD PRESSURE: 67 MMHG | HEART RATE: 93 BPM | TEMPERATURE: 98 F | RESPIRATION RATE: 16 BRPM | SYSTOLIC BLOOD PRESSURE: 102 MMHG

## 2021-03-23 PROCEDURE — 59025 FETAL NON-STRESS TEST: CPT

## 2021-03-23 NOTE — PROGRESS NOTES
Reactive NST noted by Eloisa Angel after tracing faxed to office. Discharge orders received with EFM removed. Instructed patient to return as scheduled for next NST and to return to hospital for increased pain, S&S labor or decreased fetal activity-verbalized understanding. Discharged to home undelivered in stable condition.

## 2021-03-23 NOTE — PROGRESS NOTES
48 yo  EDC 21 here to L&D for scheduled NST for Gestational Diabetes. No c/o on arrival to unit and reports feeling good fetal activity. EFM applied. PO fluids given. Call bell in reach.

## 2021-03-26 ENCOUNTER — APPOINTMENT (OUTPATIENT)
Dept: LABOR AND DELIVERY | Age: 41
End: 2021-03-26
Payer: COMMERCIAL

## 2021-03-26 ENCOUNTER — HOSPITAL ENCOUNTER (OUTPATIENT)
Age: 41
Discharge: HOME | End: 2021-03-26
Attending: OBSTETRICS & GYNECOLOGY | Admitting: OBSTETRICS & GYNECOLOGY
Payer: COMMERCIAL

## 2021-03-26 VITALS
TEMPERATURE: 97.5 F | SYSTOLIC BLOOD PRESSURE: 102 MMHG | RESPIRATION RATE: 16 BRPM | HEART RATE: 108 BPM | DIASTOLIC BLOOD PRESSURE: 65 MMHG

## 2021-03-26 PROCEDURE — 59025 FETAL NON-STRESS TEST: CPT

## 2021-03-30 ENCOUNTER — APPOINTMENT (OUTPATIENT)
Dept: LABOR AND DELIVERY | Age: 41
End: 2021-03-30
Payer: COMMERCIAL

## 2021-03-30 ENCOUNTER — HOSPITAL ENCOUNTER (OUTPATIENT)
Age: 41
Discharge: HOME | End: 2021-03-30
Attending: OBSTETRICS & GYNECOLOGY | Admitting: OBSTETRICS & GYNECOLOGY
Payer: COMMERCIAL

## 2021-03-30 VITALS — RESPIRATION RATE: 16 BRPM | HEART RATE: 101 BPM | SYSTOLIC BLOOD PRESSURE: 99 MMHG | DIASTOLIC BLOOD PRESSURE: 63 MMHG

## 2021-03-30 PROCEDURE — 59025 FETAL NON-STRESS TEST: CPT

## 2021-04-02 PROCEDURE — 59025 FETAL NON-STRESS TEST: CPT

## 2021-04-03 ENCOUNTER — HOSPITAL ENCOUNTER (OUTPATIENT)
Age: 41
Discharge: HOME | End: 2021-04-03
Attending: OBSTETRICS & GYNECOLOGY | Admitting: OBSTETRICS & GYNECOLOGY
Payer: COMMERCIAL

## 2021-04-03 VITALS
DIASTOLIC BLOOD PRESSURE: 66 MMHG | TEMPERATURE: 97.5 F | SYSTOLIC BLOOD PRESSURE: 106 MMHG | HEART RATE: 104 BPM | RESPIRATION RATE: 16 BRPM

## 2021-04-03 PROCEDURE — 59025 FETAL NON-STRESS TEST: CPT

## 2021-04-03 NOTE — PROGRESS NOTES
46 yo  EEDC 21 here to L&D with c/o decreased fetal movement since last night at 2300. States she always feels movement but not as much as usual. Denies c/o pain. Reports Hx Gestational Diabetes and Advanced Maternal Age.  EFM applied for NST

## 2021-04-03 NOTE — PROGRESS NOTES
Reactive FHR tracing observed-reactive NST.  notified that patient feeling fetal movement well now and not feeling mild contractions-orders that patient may be discharged. Instructed to return for S&S labor, increased pain or decreased fetal activity. Patient verbalized understanding. EFM removed and discharged home undelivered in stable condition.

## 2021-04-03 NOTE — PROGRESS NOTES
notified that patient here for decrease fetal movement since last night. Informed of occas/mild contractions that patient only feeling on occas. Orders that patient may be discharged and to followup as scheduled with NST's.

## 2021-04-06 ENCOUNTER — APPOINTMENT (OUTPATIENT)
Dept: LABOR AND DELIVERY | Age: 41
End: 2021-04-06
Payer: COMMERCIAL

## 2021-04-06 ENCOUNTER — HOSPITAL ENCOUNTER (OUTPATIENT)
Age: 41
Discharge: HOME | End: 2021-04-06
Attending: OBSTETRICS & GYNECOLOGY | Admitting: OBSTETRICS & GYNECOLOGY
Payer: COMMERCIAL

## 2021-04-06 VITALS — HEART RATE: 115 BPM | DIASTOLIC BLOOD PRESSURE: 65 MMHG | SYSTOLIC BLOOD PRESSURE: 99 MMHG

## 2021-04-06 PROCEDURE — 59025 FETAL NON-STRESS TEST: CPT

## 2021-04-10 ENCOUNTER — APPOINTMENT (OUTPATIENT)
Dept: LABOR AND DELIVERY | Age: 41
End: 2021-04-10
Payer: COMMERCIAL

## 2021-04-10 ENCOUNTER — HOSPITAL ENCOUNTER (OUTPATIENT)
Age: 41
Discharge: HOME | End: 2021-04-10
Attending: OBSTETRICS & GYNECOLOGY | Admitting: OBSTETRICS & GYNECOLOGY
Payer: COMMERCIAL

## 2021-04-10 VITALS — HEART RATE: 100 BPM | SYSTOLIC BLOOD PRESSURE: 103 MMHG | RESPIRATION RATE: 16 BRPM | DIASTOLIC BLOOD PRESSURE: 63 MMHG

## 2021-04-10 PROCEDURE — 59025 FETAL NON-STRESS TEST: CPT

## 2021-04-13 ENCOUNTER — APPOINTMENT (OUTPATIENT)
Dept: LABOR AND DELIVERY | Age: 41
End: 2021-04-13
Payer: COMMERCIAL

## 2021-04-13 ENCOUNTER — HOSPITAL ENCOUNTER (OUTPATIENT)
Age: 41
Discharge: HOME | End: 2021-04-13
Attending: OBSTETRICS & GYNECOLOGY | Admitting: OBSTETRICS & GYNECOLOGY
Payer: COMMERCIAL

## 2021-04-13 VITALS — SYSTOLIC BLOOD PRESSURE: 107 MMHG | HEART RATE: 96 BPM | DIASTOLIC BLOOD PRESSURE: 73 MMHG | RESPIRATION RATE: 16 BRPM

## 2021-04-13 PROCEDURE — 59025 FETAL NON-STRESS TEST: CPT

## 2021-04-17 ENCOUNTER — HOSPITAL ENCOUNTER (OUTPATIENT)
Age: 41
Discharge: HOME | End: 2021-04-17
Attending: OBSTETRICS & GYNECOLOGY | Admitting: OBSTETRICS & GYNECOLOGY
Payer: COMMERCIAL

## 2021-04-17 ENCOUNTER — APPOINTMENT (OUTPATIENT)
Dept: LABOR AND DELIVERY | Age: 41
End: 2021-04-17
Payer: COMMERCIAL

## 2021-04-17 VITALS — DIASTOLIC BLOOD PRESSURE: 70 MMHG | SYSTOLIC BLOOD PRESSURE: 107 MMHG | HEART RATE: 99 BPM

## 2021-04-17 PROCEDURE — 59025 FETAL NON-STRESS TEST: CPT

## 2021-04-17 NOTE — PROGRESS NOTES
Diagnosis:   Gestational Diabetes and AMA    Result:  Reactive       Plan:  Discharge home    F/U as scheduled      Gordy Lemus

## 2021-04-17 NOTE — PROGRESS NOTES
36 2/7 th weeks gestation here for scheduled NST. Denies vaginal bleeding, SOB, chest pain, leaking fluid or cramping at this time. Orientated to room and procedure. Placed on monitor. Plan of care discussed. Will continue to monitor. Call light with in reach.

## 2021-04-20 ENCOUNTER — APPOINTMENT (OUTPATIENT)
Dept: LABOR AND DELIVERY | Age: 41
End: 2021-04-20
Payer: COMMERCIAL

## 2021-04-20 ENCOUNTER — HOSPITAL ENCOUNTER (OUTPATIENT)
Age: 41
Discharge: HOME | End: 2021-04-20
Attending: OBSTETRICS & GYNECOLOGY | Admitting: OBSTETRICS & GYNECOLOGY
Payer: COMMERCIAL

## 2021-04-20 VITALS
HEART RATE: 95 BPM | SYSTOLIC BLOOD PRESSURE: 103 MMHG | RESPIRATION RATE: 16 BRPM | DIASTOLIC BLOOD PRESSURE: 66 MMHG | TEMPERATURE: 97.6 F

## 2021-04-20 PROCEDURE — 59025 FETAL NON-STRESS TEST: CPT

## 2021-04-23 ENCOUNTER — APPOINTMENT (OUTPATIENT)
Dept: LABOR AND DELIVERY | Age: 41
End: 2021-04-23
Payer: COMMERCIAL

## 2021-04-23 ENCOUNTER — HOSPITAL ENCOUNTER (OUTPATIENT)
Age: 41
Discharge: HOME | End: 2021-04-23
Attending: OBSTETRICS & GYNECOLOGY | Admitting: OBSTETRICS & GYNECOLOGY
Payer: COMMERCIAL

## 2021-04-23 VITALS
TEMPERATURE: 97.7 F | SYSTOLIC BLOOD PRESSURE: 92 MMHG | RESPIRATION RATE: 16 BRPM | DIASTOLIC BLOOD PRESSURE: 62 MMHG | HEART RATE: 107 BPM

## 2021-04-23 PROCEDURE — 59025 FETAL NON-STRESS TEST: CPT

## 2021-04-23 NOTE — PROGRESS NOTES
37w1d RENO 21  ambulatory to L&D floor for scheduled NST due to advanced maternal age and GDM. EFM applied. Denies any bleeding or leaking of fluids. Maternal perception of fetal movements. Call light in reach.

## 2021-04-27 ENCOUNTER — HOSPITAL ENCOUNTER (OUTPATIENT)
Age: 41
Discharge: HOME | End: 2021-04-27
Attending: OBSTETRICS & GYNECOLOGY | Admitting: OBSTETRICS & GYNECOLOGY
Payer: COMMERCIAL

## 2021-04-27 ENCOUNTER — APPOINTMENT (OUTPATIENT)
Dept: LABOR AND DELIVERY | Age: 41
End: 2021-04-27
Payer: COMMERCIAL

## 2021-04-27 VITALS — HEART RATE: 99 BPM | SYSTOLIC BLOOD PRESSURE: 103 MMHG | RESPIRATION RATE: 16 BRPM | DIASTOLIC BLOOD PRESSURE: 74 MMHG

## 2021-04-27 PROCEDURE — 59025 FETAL NON-STRESS TEST: CPT

## 2021-04-27 NOTE — PROGRESS NOTES
37w5d RENO 21  ambulatory to L&D floor for scheduled NST due to GDM and advanced maternal age. EFM applied. Denies any bleeding or leaking of fluids. Maternal perception of fetal movements. Call light in reach.

## 2021-05-01 ENCOUNTER — HOSPITAL ENCOUNTER (OUTPATIENT)
Age: 41
Discharge: HOME | End: 2021-05-01
Attending: OBSTETRICS & GYNECOLOGY | Admitting: OBSTETRICS & GYNECOLOGY
Payer: COMMERCIAL

## 2021-05-01 ENCOUNTER — APPOINTMENT (OUTPATIENT)
Dept: LABOR AND DELIVERY | Age: 41
End: 2021-05-01
Payer: COMMERCIAL

## 2021-05-01 VITALS
HEART RATE: 101 BPM | TEMPERATURE: 98.2 F | RESPIRATION RATE: 16 BRPM | SYSTOLIC BLOOD PRESSURE: 108 MMHG | DIASTOLIC BLOOD PRESSURE: 74 MMHG

## 2021-05-01 PROCEDURE — 59025 FETAL NON-STRESS TEST: CPT

## 2021-05-01 NOTE — PROGRESS NOTES
Diagnosis:   AMA and GDM    Result:  Reactive       Plan:  Discharge home    F/U as scheduled      Gordy Lemus

## 2021-05-04 ENCOUNTER — APPOINTMENT (OUTPATIENT)
Dept: LABOR AND DELIVERY | Age: 41
End: 2021-05-04
Payer: COMMERCIAL

## 2021-05-04 ENCOUNTER — HOSPITAL ENCOUNTER (OUTPATIENT)
Age: 41
Discharge: HOME | End: 2021-05-04
Attending: OBSTETRICS & GYNECOLOGY | Admitting: OBSTETRICS & GYNECOLOGY
Payer: COMMERCIAL

## 2021-05-04 VITALS
TEMPERATURE: 98.3 F | HEART RATE: 94 BPM | SYSTOLIC BLOOD PRESSURE: 101 MMHG | DIASTOLIC BLOOD PRESSURE: 64 MMHG | RESPIRATION RATE: 16 BRPM

## 2021-05-04 PROCEDURE — 59025 FETAL NON-STRESS TEST: CPT

## 2021-05-07 ENCOUNTER — APPOINTMENT (OUTPATIENT)
Dept: LABOR AND DELIVERY | Age: 41
End: 2021-05-07
Payer: COMMERCIAL

## 2021-05-07 ENCOUNTER — HOSPITAL ENCOUNTER (OUTPATIENT)
Age: 41
Discharge: HOME | End: 2021-05-07
Attending: OBSTETRICS & GYNECOLOGY | Admitting: OBSTETRICS & GYNECOLOGY
Payer: COMMERCIAL

## 2021-05-07 VITALS
RESPIRATION RATE: 18 BRPM | HEART RATE: 97 BPM | SYSTOLIC BLOOD PRESSURE: 119 MMHG | TEMPERATURE: 97.7 F | DIASTOLIC BLOOD PRESSURE: 80 MMHG

## 2021-05-07 PROCEDURE — 59025 FETAL NON-STRESS TEST: CPT

## 2021-05-10 ENCOUNTER — ANESTHESIA EVENT (OUTPATIENT)
Dept: LABOR AND DELIVERY | Age: 41
End: 2021-05-10
Payer: COMMERCIAL

## 2021-05-10 ENCOUNTER — HOSPITAL ENCOUNTER (INPATIENT)
Age: 41
LOS: 2 days | Discharge: HOME | End: 2021-05-12
Attending: OBSTETRICS & GYNECOLOGY | Admitting: OBSTETRICS & GYNECOLOGY
Payer: COMMERCIAL

## 2021-05-10 ENCOUNTER — ANESTHESIA (OUTPATIENT)
Dept: LABOR AND DELIVERY | Age: 41
End: 2021-05-10
Payer: COMMERCIAL

## 2021-05-10 ENCOUNTER — APPOINTMENT (OUTPATIENT)
Dept: LABOR AND DELIVERY | Age: 41
End: 2021-05-10
Payer: COMMERCIAL

## 2021-05-10 PROBLEM — Z33.1 PREGNANCY, NORMAL, INCIDENTAL: Status: ACTIVE | Noted: 2021-05-10

## 2021-05-10 LAB
ABO/RH: NORMAL
ALBUMIN SERPL-MCNC: 3.1 G/DL (ref 3.5–5.2)
ALP BLD-CCNC: 148 U/L (ref 35–104)
ALT SERPL-CCNC: 13 U/L (ref 0–32)
AMPHETAMINE SCREEN, URINE: NOT DETECTED
ANION GAP SERPL CALCULATED.3IONS-SCNC: 10 MMOL/L (ref 7–16)
ANTIBODY SCREEN: NORMAL
AST SERPL-CCNC: 18 U/L (ref 0–31)
BARBITURATE SCREEN URINE: NOT DETECTED
BENZODIAZEPINE SCREEN, URINE: NOT DETECTED
BILIRUB SERPL-MCNC: 0.4 MG/DL (ref 0–1.2)
BUN BLDV-MCNC: 8 MG/DL (ref 6–20)
CALCIUM SERPL-MCNC: 8.6 MG/DL (ref 8.6–10.2)
CANNABINOID SCREEN URINE: NOT DETECTED
CHLORIDE BLD-SCNC: 105 MMOL/L (ref 98–107)
CO2: 22 MMOL/L (ref 22–29)
COCAINE METABOLITE SCREEN URINE: NOT DETECTED
CREAT SERPL-MCNC: 0.7 MG/DL (ref 0.5–1)
FENTANYL SCREEN, URINE: NOT DETECTED
GFR AFRICAN AMERICAN: >60
GFR NON-AFRICAN AMERICAN: >60 ML/MIN/1.73
GLUCOSE BLD-MCNC: 90 MG/DL (ref 74–99)
HCT VFR BLD CALC: 35.3 % (ref 34–48)
HEMOGLOBIN: 11.5 G/DL (ref 11.5–15.5)
Lab: NORMAL
MCH RBC QN AUTO: 31.8 PG (ref 26–35)
MCHC RBC AUTO-ENTMCNC: 32.6 % (ref 32–34.5)
MCV RBC AUTO: 97.5 FL (ref 80–99.9)
METHADONE SCREEN, URINE: NOT DETECTED
OPIATE SCREEN URINE: NOT DETECTED
OXYCODONE URINE: NOT DETECTED
PDW BLD-RTO: 13.3 FL (ref 11.5–15)
PHENCYCLIDINE SCREEN URINE: NOT DETECTED
PLATELET # BLD: 184 E9/L (ref 130–450)
PMV BLD AUTO: 9.4 FL (ref 7–12)
POTASSIUM SERPL-SCNC: 4.2 MMOL/L (ref 3.5–5)
RBC # BLD: 3.62 E12/L (ref 3.5–5.5)
SARS-COV-2, NAAT: NOT DETECTED
SODIUM BLD-SCNC: 137 MMOL/L (ref 132–146)
TOTAL PROTEIN: 5.9 G/DL (ref 6.4–8.3)
WBC # BLD: 9.2 E9/L (ref 4.5–11.5)

## 2021-05-10 PROCEDURE — 80053 COMPREHEN METABOLIC PANEL: CPT

## 2021-05-10 PROCEDURE — 86901 BLOOD TYPING SEROLOGIC RH(D): CPT

## 2021-05-10 PROCEDURE — 59050 FETAL MONITOR W/REPORT: CPT

## 2021-05-10 PROCEDURE — 6360000002 HC RX W HCPCS: Performed by: OBSTETRICS & GYNECOLOGY

## 2021-05-10 PROCEDURE — 2580000003 HC RX 258: Performed by: ANESTHESIOLOGY

## 2021-05-10 PROCEDURE — 36415 COLL VENOUS BLD VENIPUNCTURE: CPT

## 2021-05-10 PROCEDURE — 86900 BLOOD TYPING SEROLOGIC ABO: CPT

## 2021-05-10 PROCEDURE — 2580000003 HC RX 258: Performed by: OBSTETRICS & GYNECOLOGY

## 2021-05-10 PROCEDURE — 87635 SARS-COV-2 COVID-19 AMP PRB: CPT

## 2021-05-10 PROCEDURE — 80307 DRUG TEST PRSMV CHEM ANLYZR: CPT

## 2021-05-10 PROCEDURE — 7200000001 HC VAGINAL DELIVERY

## 2021-05-10 PROCEDURE — 51702 INSERT TEMP BLADDER CATH: CPT

## 2021-05-10 PROCEDURE — 3700000025 EPIDURAL BLOCK: Performed by: ANESTHESIOLOGY

## 2021-05-10 PROCEDURE — 2500000003 HC RX 250 WO HCPCS: Performed by: ANESTHESIOLOGY

## 2021-05-10 PROCEDURE — 85027 COMPLETE CBC AUTOMATED: CPT

## 2021-05-10 PROCEDURE — 6370000000 HC RX 637 (ALT 250 FOR IP): Performed by: OBSTETRICS & GYNECOLOGY

## 2021-05-10 PROCEDURE — 1220000001 HC SEMI PRIVATE L&D R&B

## 2021-05-10 PROCEDURE — 3E033VJ INTRODUCTION OF OTHER HORMONE INTO PERIPHERAL VEIN, PERCUTANEOUS APPROACH: ICD-10-PCS | Performed by: OBSTETRICS & GYNECOLOGY

## 2021-05-10 PROCEDURE — 86850 RBC ANTIBODY SCREEN: CPT

## 2021-05-10 RX ORDER — SODIUM CHLORIDE 0.9 % (FLUSH) 0.9 %
5-40 SYRINGE (ML) INJECTION EVERY 12 HOURS SCHEDULED
Status: DISCONTINUED | OUTPATIENT
Start: 2021-05-10 | End: 2021-05-12 | Stop reason: HOSPADM

## 2021-05-10 RX ORDER — SODIUM CHLORIDE 0.9 % (FLUSH) 0.9 %
10 SYRINGE (ML) INJECTION EVERY 12 HOURS SCHEDULED
Status: DISCONTINUED | OUTPATIENT
Start: 2021-05-10 | End: 2021-05-12 | Stop reason: HOSPADM

## 2021-05-10 RX ORDER — ACETAMINOPHEN 325 MG/1
650 TABLET ORAL EVERY 4 HOURS PRN
Status: DISCONTINUED | OUTPATIENT
Start: 2021-05-10 | End: 2021-05-12 | Stop reason: HOSPADM

## 2021-05-10 RX ORDER — OXYCODONE HYDROCHLORIDE AND ACETAMINOPHEN 5; 325 MG/1; MG/1
1 TABLET ORAL EVERY 4 HOURS PRN
Status: DISCONTINUED | OUTPATIENT
Start: 2021-05-10 | End: 2021-05-12 | Stop reason: HOSPADM

## 2021-05-10 RX ORDER — SODIUM CHLORIDE 9 MG/ML
25 INJECTION, SOLUTION INTRAVENOUS PRN
Status: DISCONTINUED | OUTPATIENT
Start: 2021-05-10 | End: 2021-05-12 | Stop reason: HOSPADM

## 2021-05-10 RX ORDER — SODIUM CHLORIDE, SODIUM LACTATE, POTASSIUM CHLORIDE, CALCIUM CHLORIDE 600; 310; 30; 20 MG/100ML; MG/100ML; MG/100ML; MG/100ML
INJECTION, SOLUTION INTRAVENOUS CONTINUOUS
Status: DISCONTINUED | OUTPATIENT
Start: 2021-05-10 | End: 2021-05-12 | Stop reason: HOSPADM

## 2021-05-10 RX ORDER — SODIUM CHLORIDE, SODIUM LACTATE, POTASSIUM CHLORIDE, CALCIUM CHLORIDE 600; 310; 30; 20 MG/100ML; MG/100ML; MG/100ML; MG/100ML
500 INJECTION, SOLUTION INTRAVENOUS CONTINUOUS
Status: DISCONTINUED | OUTPATIENT
Start: 2021-05-10 | End: 2021-05-12 | Stop reason: HOSPADM

## 2021-05-10 RX ORDER — SODIUM CHLORIDE, SODIUM LACTATE, POTASSIUM CHLORIDE, CALCIUM CHLORIDE 600; 310; 30; 20 MG/100ML; MG/100ML; MG/100ML; MG/100ML
500 INJECTION, SOLUTION INTRAVENOUS
Status: ACTIVE | OUTPATIENT
Start: 2021-05-10 | End: 2021-05-10

## 2021-05-10 RX ORDER — IBUPROFEN 800 MG/1
800 TABLET ORAL EVERY 6 HOURS PRN
Status: DISCONTINUED | OUTPATIENT
Start: 2021-05-10 | End: 2021-05-12 | Stop reason: HOSPADM

## 2021-05-10 RX ORDER — DOCUSATE SODIUM 100 MG/1
100 CAPSULE, LIQUID FILLED ORAL 2 TIMES DAILY
Status: DISCONTINUED | OUTPATIENT
Start: 2021-05-10 | End: 2021-05-12 | Stop reason: HOSPADM

## 2021-05-10 RX ORDER — EPHEDRINE SULFATE/0.9% NACL/PF 50 MG/5 ML
10 SYRINGE (ML) INTRAVENOUS EVERY 5 MIN PRN
Status: DISCONTINUED | OUTPATIENT
Start: 2021-05-10 | End: 2021-05-12 | Stop reason: HOSPADM

## 2021-05-10 RX ORDER — ONDANSETRON 4 MG/1
8 TABLET, ORALLY DISINTEGRATING ORAL EVERY 8 HOURS PRN
Status: DISCONTINUED | OUTPATIENT
Start: 2021-05-10 | End: 2021-05-12 | Stop reason: HOSPADM

## 2021-05-10 RX ORDER — FERROUS SULFATE 325(65) MG
325 TABLET ORAL 2 TIMES DAILY WITH MEALS
Status: DISCONTINUED | OUTPATIENT
Start: 2021-05-11 | End: 2021-05-12 | Stop reason: HOSPADM

## 2021-05-10 RX ORDER — SODIUM CHLORIDE, SODIUM LACTATE, POTASSIUM CHLORIDE, CALCIUM CHLORIDE 600; 310; 30; 20 MG/100ML; MG/100ML; MG/100ML; MG/100ML
500 INJECTION, SOLUTION INTRAVENOUS
Status: COMPLETED | OUTPATIENT
Start: 2021-05-10 | End: 2021-05-10

## 2021-05-10 RX ORDER — ONDANSETRON 2 MG/ML
4 INJECTION INTRAMUSCULAR; INTRAVENOUS EVERY 6 HOURS PRN
Status: DISCONTINUED | OUTPATIENT
Start: 2021-05-10 | End: 2021-05-12 | Stop reason: HOSPADM

## 2021-05-10 RX ORDER — MODIFIED LANOLIN
OINTMENT (GRAM) TOPICAL PRN
Status: DISCONTINUED | OUTPATIENT
Start: 2021-05-10 | End: 2021-05-12 | Stop reason: HOSPADM

## 2021-05-10 RX ORDER — SODIUM CHLORIDE 0.9 % (FLUSH) 0.9 %
10 SYRINGE (ML) INJECTION PRN
Status: DISCONTINUED | OUTPATIENT
Start: 2021-05-10 | End: 2021-05-12 | Stop reason: HOSPADM

## 2021-05-10 RX ORDER — SODIUM CHLORIDE 0.9 % (FLUSH) 0.9 %
5-40 SYRINGE (ML) INJECTION PRN
Status: DISCONTINUED | OUTPATIENT
Start: 2021-05-10 | End: 2021-05-12 | Stop reason: HOSPADM

## 2021-05-10 RX ORDER — OXYCODONE HYDROCHLORIDE AND ACETAMINOPHEN 5; 325 MG/1; MG/1
2 TABLET ORAL EVERY 4 HOURS PRN
Status: DISCONTINUED | OUTPATIENT
Start: 2021-05-10 | End: 2021-05-12 | Stop reason: HOSPADM

## 2021-05-10 RX ADMIN — SODIUM CHLORIDE, POTASSIUM CHLORIDE, SODIUM LACTATE AND CALCIUM CHLORIDE 500 ML: 600; 310; 30; 20 INJECTION, SOLUTION INTRAVENOUS at 11:48

## 2021-05-10 RX ADMIN — DOCUSATE SODIUM 100 MG: 100 CAPSULE, LIQUID FILLED ORAL at 21:01

## 2021-05-10 RX ADMIN — Medication 2 MILLI-UNITS/MIN: at 10:00

## 2021-05-10 RX ADMIN — SODIUM CHLORIDE, POTASSIUM CHLORIDE, SODIUM LACTATE AND CALCIUM CHLORIDE: 600; 310; 30; 20 INJECTION, SOLUTION INTRAVENOUS at 14:04

## 2021-05-10 RX ADMIN — IBUPROFEN 800 MG: 800 TABLET, FILM COATED ORAL at 21:01

## 2021-05-10 RX ADMIN — Medication 15 ML: at 11:50

## 2021-05-10 RX ADMIN — BENZOCAINE AND LEVOMENTHOL: 200; 5 SPRAY TOPICAL at 21:01

## 2021-05-10 RX ADMIN — WITCH HAZEL 40 EACH: 500 SOLUTION RECTAL; TOPICAL at 21:01

## 2021-05-10 RX ADMIN — Medication 15 ML/HR: at 11:56

## 2021-05-10 RX ADMIN — SODIUM CHLORIDE, POTASSIUM CHLORIDE, SODIUM LACTATE AND CALCIUM CHLORIDE: 600; 310; 30; 20 INJECTION, SOLUTION INTRAVENOUS at 09:52

## 2021-05-10 ASSESSMENT — PAIN SCALES - GENERAL: PAINLEVEL_OUTOF10: 4

## 2021-05-10 ASSESSMENT — PAIN DESCRIPTION - DESCRIPTORS: DESCRIPTORS: ACHING;CRAMPING

## 2021-05-10 NOTE — ANESTHESIA PROCEDURE NOTES
Epidural Block    Patient location during procedure: OB  Start time: 5/10/2021 11:36 AM  End time: 5/10/2021 12:00 PM  Reason for block: labor epidural  Staffing  Performed: resident/CRNA   Anesthesiologist: Juan Jose Rodriguez MD  Resident/CRNA: ELIZABETH Cano CRNA  Preanesthetic Checklist  Completed: patient identified, IV checked, site marked, risks and benefits discussed, surgical consent, monitors and equipment checked, pre-op evaluation, timeout performed, anesthesia consent given, oxygen available and patient being monitored  Epidural  Patient position: sitting  Prep: Betadine  Patient monitoring: cardiac monitor, continuous pulse ox and frequent blood pressure checks  Approach: midline  Location: lumbar (1-5)  Injection technique: WANG saline  Provider prep: mask and sterile gloves  Needle  Needle type: Tuohy   Needle gauge: 18 G  Needle length: 2.5 in  Needle insertion depth: 4 cm  Catheter type: end hole  Catheter size: 20 G.   Catheter at skin depth: 10 cm  Test dose: negative  Assessment  Hemodynamics: stable  Attempts: 1

## 2021-05-10 NOTE — PROGRESS NOTES
Skin to skin stimulation initiated between mother and baby. Nevis is pink and alert with regular respirations. Patient instructed on safe skin to skin care practices with proper positioning of baby and assurance of unobstructed airway; verbalizes understanding.

## 2021-05-10 NOTE — PROGRESS NOTES
At 1342 pt had a deep variable on the fh-at 1346 we were attempting to locate fhr -pitocin was turned off-oxygen face mask at 10 liters,iv bolus started and pt is on her left side.-fhr was 130 at 1354. i went to check the pt and her membranes had ruptured there was a large amt of clear fluid on the chux.vag exam is 7cm and FSE applied DR ENID Salas here at the labor and delivery desk ,notified of all that has transpired from [phone]. Patient is comfortable ,pitocin remains off and oxygen is continued.

## 2021-05-10 NOTE — ANESTHESIA PRE PROCEDURE
Department of Anesthesiology  Preprocedure Note       Name:  Deisy Suero   Age:  47 y.o.  :  1980                                          MRN:  [id number]         Date:  5/10/2021      Procedure (Scheduled): Labor Epidural.    Surgeon:  Dr. Mara Forde,    Medications prior to admission:   Prior to Admission medications    Medication Sig Start Date End Date Taking?  Authorizing Provider   Prenatal Vit-Fe Fumarate-FA (PRENATAL 1+1 PO) Take 1 tablet by mouth daily   Yes Historical Provider, MD   metFORMIN ER (GLUCOPHAGE-XR) 500 MG XR tablet Take 500 mg by mouth 2 times daily    Yes Historical Provider, MD       Current medications:    Current Facility-Administered Medications   Medication Dose Route Frequency Provider Last Rate Last Admin    lactated ringers infusion   Intravenous Continuous Amine KATE Lemus  mL/hr at 05/10/21 0952 New Bag at 05/10/21 0952    sodium chloride flush 0.9 % injection 10 mL  10 mL Intravenous 2 times per day Gordy Lemus MD        sodium chloride flush 0.9 % injection 10 mL  10 mL Intravenous PRN Amine KATE Lemus MD        0.9 % sodium chloride infusion  25 mL Intravenous PRN Amine KATE Lemus MD        oxytocin (PITOCIN) 30 units in 500 mL infusion  909 marsha-units/min Intravenous PRN Amine KATE Lemus MD        And    oxytocin (PITOCIN) 30 units in 500 mL infusion  87.3 marsha-units/min Intravenous Continuous PRN Gordy Lemus MD        ondansetron (ZOFRAN) injection 4 mg  4 mg Intravenous Q6H PRN Gordy Lemus MD        docusate sodium (COLACE) capsule 100 mg  100 mg Oral BID Gordy Lemus MD        oxytocin (PITOCIN) 30 units in 500 mL infusion  1-20 marsha-units/min Intravenous Continuous Amine KATE Lemus MD 6 mL/hr at 05/10/21 1114 6 marsha-units/min at 05/10/21 1114    lactated ringers infusion 500 mL  500 mL Intravenous Continuous Nehemias Coyne MD        fentaNYL 1.85mcg/ml and bupivacaine 0.1% 15ml syringe (Home Care) (OB) 15 mL epidural  15 mL Epidural Once Danica Chinchilla MD        fentaNYL 1.85mcg/ml and Bupivicaine 0.1% in 0.9% NS 135ml infusion (OB) epidural  15 mL/hr Epidural Continuous Danica Chinchilla MD        ePHEDrine injection 10 mg  10 mg Intravenous Q5 Min PRN Danica Chinchilla MD        lactated ringers infusion 500 mL  500 mL Intravenous Once PRN Danica Chinchilla MD        lactated ringers infusion 500 mL  500 mL Intravenous Once PRN Danica Chinchilla MD           Allergies:  No Known Allergies    Problem List:    Patient Active Problem List   Diagnosis Code    Antepartum hemorrhage, antepartum O46.90    Antepartum genitourinary tract infection O23.90    Active labor at term [id number]    Incomplete  O03.4    Maternal age > 28, multigravida, third trimester O09.523    Pregnancy, complicated, third trimester O26.93    40 weeks gestation of pregnancy Z3A.40     (spontaneous vaginal delivery) O80    Missed  O02.1    Pregnancy, normal, incidental Z33.1       Past Medical History:        Diagnosis Date    Infertility, female    Duran Maternal age > 28, multigravida, third trimester 2018    Missed  2020    Polycystic ovarian syndrome      (spontaneous vaginal delivery) 2019       Past Surgical History:        Procedure Laterality Date    ADENOIDECTOMY AND TYMPANOSTOMY TUBE PLACEMENT      DILATION AND CURETTAGE OF UTERUS  2017    suction dilation and curettage    DILATION AND CURETTAGE OF UTERUS N/A 7/10/2020    SUCTION DILATATION [facility] CURETTAGE performed [facility]      TONSILLECTOMY AND ADENOIDECTOMY         Social History:    Social History     Tobacco Use    Smoking status: Never Smoker    Smokeless tobacco: Never Used   Substance Use Topics    Alcohol use:  No                                Counseling given: Not Answered      Vital Signs (Current):   Vitals:    05/10/21 1003 05/10/21 1020 05/10/21 1034 05/10/21 1103   BP: 113/74  120/74 111/77   Pulse: 89  100 88   Resp:   16 16   Temp:       TempSrc:       Weight:  116 lb (52.6 kg)     Height:  4' 9\" (1.448 m)                                                BP Readings from Last 3 Encounters:   05/10/21 111/77   05/07/21 119/80   05/04/21 101/64       NPO Status: Time of last liquid consumption: 0800                        Time of last solid consumption: 0800                        Date of last liquid consumption: 05/10/21                        Date of last solid food consumption: 05/10/21    BMI:   Wt Readings from Last 3 Encounters:   05/10/21 116 lb (52.6 kg)   07/10/20 97 lb 12.8 oz (44.4 kg)   07/02/20 112 lb (50.8 kg)     Body mass index is 25.1 kg/m². CBC:   Lab Results   Component Value Date    WBC 8.0 02/06/2021    RBC 3.74 02/06/2021    HGB 12.1 02/06/2021    HCT 37.1 02/06/2021    MCV 99.2 02/06/2021    RDW 13.5 02/06/2021     02/06/2021       CMP:   Lab Results   Component Value Date     05/10/2021    K 4.2 05/10/2021     05/10/2021    CO2 22 05/10/2021    BUN 8 05/10/2021    CREATININE 0.7 05/10/2021    GFRAA >60 05/10/2021    LABGLOM >60 05/10/2021    GLUCOSE 90 05/10/2021    PROT 5.9 05/10/2021    CALCIUM 8.6 05/10/2021    BILITOT 0.4 05/10/2021    ALKPHOS 148 05/10/2021    AST 18 05/10/2021    ALT 13 05/10/2021       POC Tests: No results for input(s): POCGLU, POCNA, POCK, POCCL, POCBUN, POCHEMO, POCHCT in the last 72 hours.     Coags: No results found for: PROTIME, INR, APTT    HCG (If Applicable): No results found for: PREGTESTUR, PREGSERUM, HCG, HCGQUANT     ABGs: No results found for: PHART, PO2ART, EAC3TGP, OFF0OGJ, BEART, C6MUEVKP     Type & Screen (If Applicable):  No results found for: LABABO, LABRH    Drug/Infectious Status (If Applicable):  No results found for: HIV, HEPCAB    COVID-19 Screening (If Applicable):   Lab Results   Component Value Date    COVID19 Not Detected 07/07/2020           Anesthesia Evaluation  Patient summary reviewed  Airway:         Dental:          Pulmonary:Negative Pulmonary ROS                              Cardiovascular:          ECG reviewed                     ROS comment: EKG: Normal Sinus Rhythm 81. Neuro/Psych:   Negative Neuro/Psych ROS              GI/Hepatic/Renal: Neg GI/Hepatic/Renal ROS            Endo/Other:                      ROS comment: Infertility. Polycystic Ovarian Syndrome. H/O Missed . Abdominal:           Vascular: negative vascular ROS. Anesthesia Plan      epidural     ASA 2     (Labor Epidural.  )  Induction: intravenous. Anesthetic plan and risks discussed with patient. Plan discussed with CRNA.     Attending anesthesiologist reviewed and agrees with Helen Torrez MD   5/10/2021

## 2021-05-10 NOTE — PROGRESS NOTES
DR ABELINO PARDO here-pt is pushing with contractions. RN reviewing fhr and contractions every 15 min.

## 2021-05-10 NOTE — PROGRESS NOTES
Subjective:      Asia Pereira is an 47 y.o. female at 44 and 4/7 weeks gestation presenting with   Chief Complaint   Patient presents with    Scheduled Induction   Pitocin was started on admission. Denies bleeding or fluid leak. .She is also complaining of contractions every 4 minutes lasting 30 seconds. Other associated symptoms include low back pain. Fetal Movement: normal.  Past Medical History:   Diagnosis Date    Infertility, female    Silvino Layer Maternal age > 28, multigravida, third trimester 2018    Missed  2020    Polycystic ovarian syndrome      (spontaneous vaginal delivery) 2019       Review of Systems  Pertinent items are noted in HPI. Objective:      /74   Pulse 96   Temp 98.4 °F (36.9 °C) (Oral)   Resp 16   Ht 4' 9\" (1.448 m)   Wt 116 lb (52.6 kg)   LMP 2020   SpO2 100%   BMI 25.10 kg/m²   Blood pressure 108/74, pulse 96, temperature 98.4 °F (36.9 °C), temperature source Oral, resp. rate 16, height 4' 9\" (1.448 m), weight 116 lb (52.6 kg), last menstrual period 2020, SpO2 100 %, unknown if currently breastfeeding. General:   alert, appears stated age and cooperative   Cervix:   5 cm dilated, 70% effaced vertex at S- 2.    FHT:   140 BPM     Lab Review    CBC:   Lab Results   Component Value Date    WBC 9.2 05/10/2021    RBC 3.62 05/10/2021    HGB 11.5 05/10/2021    HCT 35.3 05/10/2021    MCV 97.5 05/10/2021    MCH 31.8 05/10/2021    MCHC 32.6 05/10/2021    RDW 13.3 05/10/2021     05/10/2021    MPV 9.4 05/10/2021     CMP:    Lab Results   Component Value Date     05/10/2021    K 4.2 05/10/2021     05/10/2021    CO2 22 05/10/2021    BUN 8 05/10/2021    CREATININE 0.7 05/10/2021    GFRAA >60 05/10/2021    LABGLOM >60 05/10/2021    GLUCOSE 90 05/10/2021    PROT 5.9 05/10/2021    LABALBU 3.1 05/10/2021    CALCIUM 8.6 05/10/2021    BILITOT 0.4 05/10/2021    ALKPHOS 148 05/10/2021    AST 18 05/10/2021    ALT 13 05/10/2021     U/A:    Lab Results   Component Value Date    COLORU RED 01/10/2014    PHUR 8.5 01/10/2014    WBCUA 5-10 01/10/2014    RBCUA >20 01/10/2014    BACTERIA MODERATE 01/10/2014    CLARITYU CLOUDY 01/10/2014    SPECGRAV 1.020 01/10/2014    LEUKOCYTESUR MODERATE 01/10/2014    UROBILINOGEN 1.0 01/10/2014    BILIRUBINUR NEGATIVE 01/10/2014    BLOODU LARGE 01/10/2014    GLUCOSEU 100 01/10/2014          Assessment:     39 weeks and 4-day pregnancy  Advanced maternal age  Gestational diabetes    Plan:     Expectant vaginal delivery  Epidural if desired    Gordy Lemus MD,5/10/2021 4:33 PM

## 2021-05-11 LAB
HCT VFR BLD CALC: 27.1 % (ref 34–48)
HEMOGLOBIN: 8.9 G/DL (ref 11.5–15.5)

## 2021-05-11 PROCEDURE — 85018 HEMOGLOBIN: CPT

## 2021-05-11 PROCEDURE — 85014 HEMATOCRIT: CPT

## 2021-05-11 PROCEDURE — 1220000001 HC SEMI PRIVATE L&D R&B

## 2021-05-11 PROCEDURE — 36415 COLL VENOUS BLD VENIPUNCTURE: CPT

## 2021-05-11 PROCEDURE — 6370000000 HC RX 637 (ALT 250 FOR IP): Performed by: OBSTETRICS & GYNECOLOGY

## 2021-05-11 RX ADMIN — DOCUSATE SODIUM 100 MG: 100 CAPSULE, LIQUID FILLED ORAL at 21:29

## 2021-05-11 RX ADMIN — FERROUS SULFATE TAB 325 MG (65 MG ELEMENTAL FE) 325 MG: 325 (65 FE) TAB at 09:21

## 2021-05-11 RX ADMIN — FERROUS SULFATE TAB 325 MG (65 MG ELEMENTAL FE) 325 MG: 325 (65 FE) TAB at 17:18

## 2021-05-11 RX ADMIN — IBUPROFEN 800 MG: 800 TABLET, FILM COATED ORAL at 05:59

## 2021-05-11 RX ADMIN — ACETAMINOPHEN 650 MG: 325 TABLET ORAL at 09:19

## 2021-05-11 RX ADMIN — DOCUSATE SODIUM 100 MG: 100 CAPSULE, LIQUID FILLED ORAL at 09:19

## 2021-05-11 RX ADMIN — ACETAMINOPHEN 650 MG: 325 TABLET ORAL at 17:22

## 2021-05-11 ASSESSMENT — PAIN SCALES - GENERAL
PAINLEVEL_OUTOF10: 0
PAINLEVEL_OUTOF10: 3
PAINLEVEL_OUTOF10: 2

## 2021-05-11 NOTE — PROGRESS NOTES
Patient up to shower. Bed linens changed. Spray and tucks pads given to patient. Patient tolerated walking well.

## 2021-05-11 NOTE — PROGRESS NOTES
Department of Obstetrics and Gynecology  Labor and Delivery  Attending Post Partum Progress Note      SUBJECTIVE:  Doing well with no complaints  OBJECTIVE:      Vitals:  /62   Pulse 90   Temp 97.5 °F (36.4 °C) (Oral)   Resp 16   Ht 4' 9\" (1.448 m)   Wt 116 lb (52.6 kg)   LMP 04/06/2020   SpO2 100%   Breastfeeding Unknown   BMI 25.10 kg/m²     ABDOMEN:  Soft, well contracted uterus   Lochia: Normal  No calf tenderness    DATA:    CBC:    Lab Results   Component Value Date    WBC 9.2 05/10/2021    RBC 3.62 05/10/2021    HGB 8.9 05/11/2021    HCT 27.1 05/11/2021    MCV 97.5 05/10/2021    RDW 13.3 05/10/2021     05/10/2021       ASSESSMENT & PLAN:      PPD # 1    Continue current care. Discharge home tomorrow.

## 2021-05-12 VITALS
SYSTOLIC BLOOD PRESSURE: 109 MMHG | TEMPERATURE: 97.8 F | DIASTOLIC BLOOD PRESSURE: 68 MMHG | HEIGHT: 57 IN | HEART RATE: 93 BPM | OXYGEN SATURATION: 100 % | BODY MASS INDEX: 25.03 KG/M2 | WEIGHT: 116 LBS | RESPIRATION RATE: 16 BRPM

## 2021-05-12 PROBLEM — Z33.1 PREGNANCY, NORMAL, INCIDENTAL: Status: RESOLVED | Noted: 2021-05-10 | Resolved: 2021-05-12

## 2021-05-12 PROBLEM — Z3A.39 39 WEEKS GESTATION OF PREGNANCY: Status: ACTIVE | Noted: 2019-02-07

## 2021-05-12 PROBLEM — O24.419 GESTATIONAL DIABETES MELLITUS (GDM) IN THIRD TRIMESTER: Status: ACTIVE | Noted: 2021-05-12

## 2021-05-12 PROCEDURE — 6370000000 HC RX 637 (ALT 250 FOR IP): Performed by: OBSTETRICS & GYNECOLOGY

## 2021-05-12 RX ORDER — IBUPROFEN 800 MG/1
800 TABLET ORAL EVERY 6 HOURS PRN
Qty: 120 TABLET | Refills: 0 | Status: SHIPPED | OUTPATIENT
Start: 2021-05-12

## 2021-05-12 RX ADMIN — FERROUS SULFATE TAB 325 MG (65 MG ELEMENTAL FE) 325 MG: 325 (65 FE) TAB at 08:05

## 2021-05-12 RX ADMIN — DOCUSATE SODIUM 100 MG: 100 CAPSULE, LIQUID FILLED ORAL at 08:05

## 2021-05-12 NOTE — DISCHARGE SUMMARY
Obstetrical Discharge Form         Patients Name  Una Shoulders    Gestational Age:  43w3d    Antepartum complications: gestational diabetes and advanced maternal age    Date of Delivery:   5/10/2021       4:58 PM      Type of Delivery:   Vaginal, Spontaneous [250]   Rupture Date/time:               Presentation:    Vertex [1]   Position:                      Anesthesia:    Epidural [254]     Feeding method:         Delivered By:   SANAM Castro     Baby:       Information for the patient's :   Hannah Fish Girl Flower Antony [[id number]]          Intrapartum complications: None  Postpartum complications: none  Discharge Date:   2021  Discharge Condition: Stable  Disposition:   Home    Plan:   Follow up    in 6 weeks

## 2021-05-12 NOTE — PROGRESS NOTES
Department of Obstetrics and Gynecology  Labor and Delivery  Attending Post Partum Progress Note      SUBJECTIVE:  Doing well with no complaints  OBJECTIVE:      Vitals:  /73   Pulse 89   Temp 97.6 °F (36.4 °C) (Oral)   Resp 17   Ht 4' 9\" (1.448 m)   Wt 116 lb (52.6 kg)   LMP 04/06/2020   SpO2 100%   Breastfeeding Unknown   BMI 25.10 kg/m²     ABDOMEN:  Soft, well contracted uterus   Lochia: Normal  No calf tenderness    DATA:    CBC:    Lab Results   Component Value Date    WBC 9.2 05/10/2021    RBC 3.62 05/10/2021    HGB 8.9 05/11/2021    HCT 27.1 05/11/2021    MCV 97.5 05/10/2021    RDW 13.3 05/10/2021     05/10/2021       ASSESSMENT & PLAN:      PPD # 2    Continue current care. Discharge home today.

## 2021-05-12 NOTE — PROGRESS NOTES
CLINICAL PHARMACY NOTE: MEDS TO [address] Select Patient?: No  Total # of Prescriptions Filled: 1   The following medications were delivered to the patient:  · Ibuprofen 800 mg  Total # of Interventions Completed: 3  Time Spent (min): 15    Additional Documentation:

## 2021-05-12 NOTE — PLAN OF CARE
Problem: Pain:  Description: Pain management should include both nonpharmacologic and pharmacologic interventions.   Goal: Pain level will decrease  Description: Pain level will decrease  Outcome: Met This Shift  Goal: Control of acute pain  Description: Control of acute pain  Outcome: Met This Shift  Goal: Control of chronic pain  Description: Control of chronic pain  Outcome: Completed  Note: N/a       Problem: Discharge Planning:  Goal: Discharged to appropriate level of care  Description: Discharged to appropriate level of care  Outcome: Met This Shift     Problem: Constipation:  Goal: Bowel elimination is within specified parameters  Description: Bowel elimination is within specified parameters  Outcome: Met This Shift     Problem: Fluid Volume - Imbalance:  Goal: Absence of imbalanced fluid volume signs and symptoms  Description: Absence of imbalanced fluid volume signs and symptoms  Outcome: Met This Shift  Goal: Absence of postpartum hemorrhage signs and symptoms  Description: Absence of postpartum hemorrhage signs and symptoms  Outcome: Met This Shift     Problem: Infection - Risk of, Puerperal Infection:  Goal: Will show no infection signs and symptoms  Description: Will show no infection signs and symptoms  Outcome: Met This Shift     Problem: Mood - Altered:  Goal: Mood stable  Description: Mood stable  Outcome: Met This Shift     Problem: Pain - Acute:  Goal: Pain level will decrease  Description: Pain level will decrease  Outcome: Met This Shift

## 2021-05-14 NOTE — ANESTHESIA POSTPROCEDURE EVALUATION
Department of Anesthesiology  Postprocedure Note    Patient: Darnell Mason  MRN: [id number]  YOB: 1980  Date of evaluation: 5/14/2021  Time:  6:44 AM     Procedure Summary     Date: 05/10/21 Room / Location:     Anesthesia Start: 6334 Anesthesia Stop: 4574    Procedure: Labor Analgesia Diagnosis:     Scheduled Providers:  Responsible Provider: Yousif Joy MD    Anesthesia Type: epidural ASA Status: 2          Anesthesia Type: epidural    Riya Phase I:      Riya Phase II:      Last vitals: Reviewed and per EMR flowsheets.        Anesthesia Post Evaluation    Patient location during evaluation: PACU  Patient participation: complete - patient participated  Level of consciousness: awake  Pain score: 0  Airway patency: patent  Nausea & Vomiting: no nausea  Complications: no  Cardiovascular status: blood pressure returned to baseline  Respiratory status: acceptable  Hydration status: euvolemic

## 2021-05-14 NOTE — ANESTHESIA PRE PROCEDURE
Department of Anesthesiology  Preprocedure Note       Name:  Merle Hoang   Age:  47 y.o.  :  1980                                          MRN:  [id number]         Date:  2021      Procedure (Scheduled): Labor Epidural.    Surgeon:  Dr. Reyna Cleaning,    Medications prior to admission:   Prior to Admission medications    Medication Sig Start Date End Date Taking? Authorizing Provider   ibuprofen (ADVIL;MOTRIN) 800 MG tablet Take 1 tablet by mouth every 6 hours as needed for Pain 21   Padmini Kaufman MD   Prenatal Vit-Fe Fumarate-FA (PRENATAL 1+1 PO) Take 1 tablet by mouth daily    Historical Provider, MD   metFORMIN ER (GLUCOPHAGE-XR) 500 MG XR tablet Take 500 mg by mouth 2 times daily     Historical Provider, MD       Current medications:    Current Outpatient Medications   Medication Sig Dispense Refill    ibuprofen (ADVIL;MOTRIN) 800 MG tablet Take 1 tablet by mouth every 6 hours as needed for Pain 120 tablet 0    Prenatal Vit-Fe Fumarate-FA (PRENATAL 1+1 PO) Take 1 tablet by mouth daily      metFORMIN ER (GLUCOPHAGE-XR) 500 MG XR tablet Take 500 mg by mouth 2 times daily        No current facility-administered medications for this visit.         Allergies:  No Known Allergies    Problem List:    Patient Active Problem List   Diagnosis Code    Antepartum hemorrhage, antepartum O46.90    Antepartum genitourinary tract infection O23.90    Active labor at term [id number]    Incomplete  O03.4    Multigravida of advanced maternal age in third trimester O09.523    Pregnancy, complicated, third trimester O26.80    44 weeks gestation of pregnancy Z3A.39     (spontaneous vaginal delivery) O80    Missed  O02.1    Gestational diabetes mellitus (GDM) in third trimester O24.419       Past Medical History:        Diagnosis Date    43 weeks gestation of pregnancy 2019    Gestational diabetes mellitus (GDM) in third trimester 2021    Gestational diabetes mellitus (GDM) in third trimester 2021    Infertility, female    [facility] Maternal age > 28, multigravida, third trimester 2018    Missed  2020    Polycystic ovarian syndrome      (spontaneous vaginal delivery) 2019       Past Surgical History:        Procedure Laterality Date    ADENOIDECTOMY AND TYMPANOSTOMY TUBE PLACEMENT      DILATION AND CURETTAGE OF UTERUS  2017    suction dilation and curettage    DILATION AND CURETTAGE OF UTERUS N/A 7/10/2020    SUCTION DILATATION AND CURETTAGE performed by Gareth Courtney MD at [place] [address] AND ADENOIDECTOMY         Social History:    Social History     Tobacco Use    Smoking status: Never Smoker    Smokeless tobacco: Never Used   Substance Use Topics    Alcohol use: No                                Counseling given: Not Answered      Vital Signs (Current): There were no vitals filed for this visit.                                            BP Readings from Last 3 Encounters:   21 109/68   21 119/80   21 101/64       NPO Status:                                                                                 BMI:   Wt Readings from Last 3 Encounters:   05/10/21 116 lb (52.6 kg)   07/10/20 97 lb 12.8 oz (44.4 kg)   20 112 lb (50.8 kg)     There is no height or weight on file to calculate BMI.    CBC:   Lab Results   Component Value Date    WBC 9.2 05/10/2021    RBC 3.62 05/10/2021    HGB 8.9 2021    HCT 27.1 2021    MCV 97.5 05/10/2021    RDW 13.3 05/10/2021     05/10/2021       CMP:   Lab Results   Component Value Date     05/10/2021    K 4.2 05/10/2021     05/10/2021    CO2 22 05/10/2021    BUN 8 05/10/2021    CREATININE 0.7 05/10/2021    GFRAA >60 05/10/2021    LABGLOM >60 05/10/2021    GLUCOSE 90 05/10/2021    PROT 5.9 05/10/2021    CALCIUM 8.6 05/10/2021    BILITOT 0.4 05/10/2021    ALKPHOS 148 05/10/2021    AST 18 05/10/2021    ALT 13 05/10/2021       POC Tests: No results for input(s): POCGLU, POCNA, POCK, POCCL, POCBUN, POCHEMO, POCHCT in the last 72 hours. Coags: No results found for: PROTIME, INR, APTT    HCG (If Applicable): No results found for: PREGTESTUR, PREGSERUM, HCG, HCGQUANT     ABGs: No results found for: PHART, PO2ART, OWM0XGW, CEO2VVO, BEART, L0DNERAH     Type & Screen (If Applicable):  No results found for: LABABO, LABRH    Drug/Infectious Status (If Applicable):  No results found for: HIV, HEPCAB    COVID-19 Screening (If Applicable):   Lab Results   Component Value Date    COVID19 Not Detected 05/10/2021    COVID19 Not Detected 2020           Anesthesia Evaluation  Patient summary reviewed  Airway: Mallampati: Unable to assess / NA        Dental: normal exam         Pulmonary:Negative Pulmonary ROS and normal exam                               Cardiovascular:Negative CV ROS          ECG reviewed  Rhythm: regular  Rate: normal                 ROS comment: EKG: Normal Sinus Rhythm 81. Neuro/Psych:   Negative Neuro/Psych ROS              GI/Hepatic/Renal: Neg GI/Hepatic/Renal ROS            Endo/Other:                      ROS comment: Infertility. Polycystic Ovarian Syndrome. H/O Missed . Abdominal:           Vascular: negative vascular ROS. Anesthesia Plan      epidural     ASA 2     (Labor Epidural.  )  Induction: intravenous. Anesthetic plan and risks discussed with patient. Plan discussed with CRNA.     Attending anesthesiologist reviewed and agrees with Kacey Oakley MD   2021

## 2021-05-14 NOTE — H&P
Mena Charles is an 47 y.o. female at 44 and 4/7 weeks gestation presenting with       Chief Complaint   Patient presents with    Scheduled Induction   Pitocin was started on admission. Denies bleeding or fluid leak. .She is also complaining of contractions every 4 minutes lasting 30 seconds. Other associated symptoms include low back pain. Fetal Movement: normal.  Past Medical History        Past Medical History:   Diagnosis Date    Infertility, female      Maternal age > 28, multigravida, third trimester 2018    Missed  2020    Polycystic ovarian syndrome       (spontaneous vaginal delivery) 2019            Review of Systems  Pertinent items are noted in HPI. Objective:       /74   Pulse 96   Temp 98.4 °F (36.9 °C) (Oral)   Resp 16   Ht 4' 9\" (1.448 m)   Wt 116 lb (52.6 kg)   LMP 2020   SpO2 100%   BMI 25.10 kg/m²   Blood pressure 108/74, pulse 96, temperature 98.4 °F (36.9 °C), temperature source Oral, resp. rate 16, height 4' 9\" (1.448 m), weight 116 lb (52.6 kg), last menstrual period 2020, SpO2 100 %, unknown if currently breastfeeding. General:   alert, appears stated age and cooperative   Cervix:   5 cm dilated, 70% effaced vertex at S- 2.    FHT:   140 BPM      Lab Review     CBC:         Lab Results   Component Value Date     WBC 9.2 05/10/2021     RBC 3.62 05/10/2021     HGB 11.5 05/10/2021     HCT 35.3 05/10/2021     MCV 97.5 05/10/2021     MCH 31.8 05/10/2021     MCHC 32.6 05/10/2021     RDW 13.3 05/10/2021      05/10/2021     MPV 9.4 05/10/2021      CMP:          Lab Results   Component Value Date      05/10/2021     K 4.2 05/10/2021      05/10/2021     CO2 22 05/10/2021     BUN 8 05/10/2021     CREATININE 0.7 05/10/2021     GFRAA >60 05/10/2021     LABGLOM >60 05/10/2021     GLUCOSE 90 05/10/2021     PROT 5.9 05/10/2021     LABALBU 3.1 05/10/2021     CALCIUM 8.6 05/10/2021     BILITOT 0.4 05/10/2021     ALKPHOS 148

## (undated) DEVICE — TOWEL,OR,DSP,ST,BLUE,STD,6/PK,12PK/CS: Brand: MEDLINE

## (undated) DEVICE — GAUZE,SPONGE,4"X4",16PLY,XRAY,STRL,LF: Brand: MEDLINE

## (undated) DEVICE — CATHETER,URETHRAL,VINYL,MALE,16",16 FR: Brand: MEDLINE

## (undated) DEVICE — DOUBLE BASIN SET: Brand: MEDLINE INDUSTRIES, INC.

## (undated) DEVICE — GOWN,SIRUS,NONRNF,SETINSLV,XL,20/CS: Brand: MEDLINE

## (undated) DEVICE — GLOVE ORANGE PI 7 1/2   MSG9075

## (undated) DEVICE — PAD MATERNITY CURITY ADH STRIP DISP

## (undated) DEVICE — COVER,LIGHT HANDLE,FLX,1/PK: Brand: MEDLINE INDUSTRIES, INC.

## (undated) DEVICE — PACK PROC 3IN1 W/ L12FT DIA0.25IN REINF SUCT TBNG W50XL901IN

## (undated) DEVICE — TRAY PROCED DILATATION CURETTAGE

## (undated) DEVICE — SMARTGOWN BREATHABLE SURGICAL GOWN: Brand: CONVERTORS

## (undated) DEVICE — TRAY,VAG PREP,2PR VNYL GLV,4 C: Brand: MEDLINE INDUSTRIES, INC.

## (undated) DEVICE — Z DISCONTINUED USE 2659133 CANNULA VAC DIA8MM CRV SEMI RIG W/ ROUNDED TIP TAPR END

## (undated) DEVICE — MARKER,SKIN,WI/RULER AND LABELS: Brand: MEDLINE